# Patient Record
Sex: MALE | Race: WHITE | NOT HISPANIC OR LATINO | URBAN - METROPOLITAN AREA
[De-identification: names, ages, dates, MRNs, and addresses within clinical notes are randomized per-mention and may not be internally consistent; named-entity substitution may affect disease eponyms.]

---

## 2018-08-14 ENCOUNTER — INPATIENT (INPATIENT)
Facility: HOSPITAL | Age: 58
LOS: 2 days | Discharge: HOME | DRG: 305 | End: 2018-08-17
Attending: STUDENT IN AN ORGANIZED HEALTH CARE EDUCATION/TRAINING PROGRAM | Admitting: STUDENT IN AN ORGANIZED HEALTH CARE EDUCATION/TRAINING PROGRAM
Payer: MEDICAID

## 2018-08-14 VITALS — HEIGHT: 68 IN | WEIGHT: 240.08 LBS

## 2018-08-14 DIAGNOSIS — I16.1 HYPERTENSIVE EMERGENCY: ICD-10-CM

## 2018-08-14 DIAGNOSIS — Z98.890 OTHER SPECIFIED POSTPROCEDURAL STATES: Chronic | ICD-10-CM

## 2018-08-14 DIAGNOSIS — Z90.49 ACQUIRED ABSENCE OF OTHER SPECIFIED PARTS OF DIGESTIVE TRACT: Chronic | ICD-10-CM

## 2018-08-14 DIAGNOSIS — I10 ESSENTIAL (PRIMARY) HYPERTENSION: ICD-10-CM

## 2018-08-14 DIAGNOSIS — Z29.9 ENCOUNTER FOR PROPHYLACTIC MEASURES, UNSPECIFIED: ICD-10-CM

## 2018-08-14 LAB
ACETONE SERPL-MCNC: NEGATIVE — SIGNIFICANT CHANGE UP
ALBUMIN SERPL ELPH-MCNC: 4.2 G/DL — SIGNIFICANT CHANGE UP (ref 3.5–5)
ALP SERPL-CCNC: 58 U/L — SIGNIFICANT CHANGE UP (ref 40–120)
ALT FLD-CCNC: 50 U/L DA — SIGNIFICANT CHANGE UP (ref 10–60)
ANION GAP SERPL CALC-SCNC: 8 MMOL/L — SIGNIFICANT CHANGE UP (ref 5–17)
APTT BLD: 35.1 SEC — SIGNIFICANT CHANGE UP (ref 27.5–37.4)
AST SERPL-CCNC: 25 U/L — SIGNIFICANT CHANGE UP (ref 10–40)
BASOPHILS # BLD AUTO: 0.1 K/UL — SIGNIFICANT CHANGE UP (ref 0–0.2)
BASOPHILS NFR BLD AUTO: 1.2 % — SIGNIFICANT CHANGE UP (ref 0–2)
BILIRUB SERPL-MCNC: 0.4 MG/DL — SIGNIFICANT CHANGE UP (ref 0.2–1.2)
BUN SERPL-MCNC: 16 MG/DL — SIGNIFICANT CHANGE UP (ref 7–18)
CALCIUM SERPL-MCNC: 8.6 MG/DL — SIGNIFICANT CHANGE UP (ref 8.4–10.5)
CHLORIDE SERPL-SCNC: 102 MMOL/L — SIGNIFICANT CHANGE UP (ref 96–108)
CHOLEST SERPL-MCNC: 242 MG/DL — HIGH (ref 10–199)
CK MB BLD-MCNC: 0.9 % — SIGNIFICANT CHANGE UP (ref 0–3.5)
CK MB BLD-MCNC: 1 % — SIGNIFICANT CHANGE UP (ref 0–3.5)
CK MB CFR SERPL CALC: 1.7 NG/ML — SIGNIFICANT CHANGE UP (ref 0–3.6)
CK MB CFR SERPL CALC: 1.8 NG/ML — SIGNIFICANT CHANGE UP (ref 0–3.6)
CK SERPL-CCNC: 180 U/L — SIGNIFICANT CHANGE UP (ref 35–232)
CK SERPL-CCNC: 182 U/L — SIGNIFICANT CHANGE UP (ref 35–232)
CO2 SERPL-SCNC: 31 MMOL/L — SIGNIFICANT CHANGE UP (ref 22–31)
CREAT SERPL-MCNC: 1.29 MG/DL — SIGNIFICANT CHANGE UP (ref 0.5–1.3)
EOSINOPHIL # BLD AUTO: 0.2 K/UL — SIGNIFICANT CHANGE UP (ref 0–0.5)
EOSINOPHIL NFR BLD AUTO: 3.8 % — SIGNIFICANT CHANGE UP (ref 0–6)
GLUCOSE SERPL-MCNC: 81 MG/DL — SIGNIFICANT CHANGE UP (ref 70–99)
HCT VFR BLD CALC: 47.7 % — SIGNIFICANT CHANGE UP (ref 39–50)
HDLC SERPL-MCNC: 50 MG/DL — SIGNIFICANT CHANGE UP (ref 40–125)
HGB BLD-MCNC: 15.7 G/DL — SIGNIFICANT CHANGE UP (ref 13–17)
INR BLD: 0.92 RATIO — SIGNIFICANT CHANGE UP (ref 0.88–1.16)
LIDOCAIN IGE QN: 291 U/L — SIGNIFICANT CHANGE UP (ref 73–393)
LIPID PNL WITH DIRECT LDL SERPL: 163 MG/DL — SIGNIFICANT CHANGE UP
LYMPHOCYTES # BLD AUTO: 1.3 K/UL — SIGNIFICANT CHANGE UP (ref 1–3.3)
LYMPHOCYTES # BLD AUTO: 21.7 % — SIGNIFICANT CHANGE UP (ref 13–44)
MAGNESIUM SERPL-MCNC: 2.1 MG/DL — SIGNIFICANT CHANGE UP (ref 1.6–2.6)
MCHC RBC-ENTMCNC: 29.2 PG — SIGNIFICANT CHANGE UP (ref 27–34)
MCHC RBC-ENTMCNC: 33 GM/DL — SIGNIFICANT CHANGE UP (ref 32–36)
MCV RBC AUTO: 88.6 FL — SIGNIFICANT CHANGE UP (ref 80–100)
MONOCYTES # BLD AUTO: 0.7 K/UL — SIGNIFICANT CHANGE UP (ref 0–0.9)
MONOCYTES NFR BLD AUTO: 11.2 % — SIGNIFICANT CHANGE UP (ref 2–14)
NEUTROPHILS # BLD AUTO: 3.7 K/UL — SIGNIFICANT CHANGE UP (ref 1.8–7.4)
NEUTROPHILS NFR BLD AUTO: 62 % — SIGNIFICANT CHANGE UP (ref 43–77)
NT-PROBNP SERPL-SCNC: 36 PG/ML — SIGNIFICANT CHANGE UP (ref 0–125)
PLATELET # BLD AUTO: 220 K/UL — SIGNIFICANT CHANGE UP (ref 150–400)
POTASSIUM SERPL-MCNC: 3.4 MMOL/L — LOW (ref 3.5–5.3)
POTASSIUM SERPL-SCNC: 3.4 MMOL/L — LOW (ref 3.5–5.3)
PROT SERPL-MCNC: 8 G/DL — SIGNIFICANT CHANGE UP (ref 6–8.3)
PROTHROM AB SERPL-ACNC: 10 SEC — SIGNIFICANT CHANGE UP (ref 9.8–12.7)
RBC # BLD: 5.38 M/UL — SIGNIFICANT CHANGE UP (ref 4.2–5.8)
RBC # FLD: 12.2 % — SIGNIFICANT CHANGE UP (ref 10.3–14.5)
SODIUM SERPL-SCNC: 141 MMOL/L — SIGNIFICANT CHANGE UP (ref 135–145)
TOTAL CHOLESTEROL/HDL RATIO MEASUREMENT: 4.8 RATIO — SIGNIFICANT CHANGE UP (ref 3.4–9.6)
TRIGL SERPL-MCNC: 146 MG/DL — SIGNIFICANT CHANGE UP (ref 10–149)
TROPONIN I SERPL-MCNC: <0.015 NG/ML — SIGNIFICANT CHANGE UP (ref 0–0.04)
TROPONIN I SERPL-MCNC: <0.015 NG/ML — SIGNIFICANT CHANGE UP (ref 0–0.04)
TSH SERPL-MCNC: 3.1 UU/ML — SIGNIFICANT CHANGE UP (ref 0.34–4.82)
WBC # BLD: 6 K/UL — SIGNIFICANT CHANGE UP (ref 3.8–10.5)
WBC # FLD AUTO: 6 K/UL — SIGNIFICANT CHANGE UP (ref 3.8–10.5)

## 2018-08-14 PROCEDURE — 71045 X-RAY EXAM CHEST 1 VIEW: CPT | Mod: 26

## 2018-08-14 PROCEDURE — 99291 CRITICAL CARE FIRST HOUR: CPT

## 2018-08-14 RX ORDER — LABETALOL HCL 100 MG
10 TABLET ORAL ONCE
Qty: 0 | Refills: 0 | Status: DISCONTINUED | OUTPATIENT
Start: 2018-08-14 | End: 2018-08-14

## 2018-08-14 RX ORDER — ENOXAPARIN SODIUM 100 MG/ML
40 INJECTION SUBCUTANEOUS DAILY
Qty: 0 | Refills: 0 | Status: DISCONTINUED | OUTPATIENT
Start: 2018-08-14 | End: 2018-08-17

## 2018-08-14 RX ORDER — NITROGLYCERIN 6.5 MG
2 CAPSULE, EXTENDED RELEASE ORAL
Qty: 50 | Refills: 0 | Status: DISCONTINUED | OUTPATIENT
Start: 2018-08-14 | End: 2018-08-15

## 2018-08-14 RX ORDER — METOPROLOL TARTRATE 50 MG
100 TABLET ORAL
Qty: 0 | Refills: 0 | Status: DISCONTINUED | OUTPATIENT
Start: 2018-08-14 | End: 2018-08-14

## 2018-08-14 RX ORDER — LISINOPRIL 2.5 MG/1
5 TABLET ORAL DAILY
Qty: 0 | Refills: 0 | Status: DISCONTINUED | OUTPATIENT
Start: 2018-08-14 | End: 2018-08-14

## 2018-08-14 RX ORDER — LABETALOL HCL 100 MG
200 TABLET ORAL EVERY 8 HOURS
Qty: 0 | Refills: 0 | Status: DISCONTINUED | OUTPATIENT
Start: 2018-08-14 | End: 2018-08-14

## 2018-08-14 RX ORDER — POTASSIUM CHLORIDE 20 MEQ
40 PACKET (EA) ORAL ONCE
Qty: 0 | Refills: 0 | Status: COMPLETED | OUTPATIENT
Start: 2018-08-14 | End: 2018-08-14

## 2018-08-14 RX ORDER — LABETALOL HCL 100 MG
100 TABLET ORAL EVERY 8 HOURS
Qty: 0 | Refills: 0 | Status: DISCONTINUED | OUTPATIENT
Start: 2018-08-14 | End: 2018-08-14

## 2018-08-14 RX ORDER — METOPROLOL TARTRATE 50 MG
100 TABLET ORAL EVERY 8 HOURS
Qty: 0 | Refills: 0 | Status: DISCONTINUED | OUTPATIENT
Start: 2018-08-15 | End: 2018-08-15

## 2018-08-14 RX ORDER — ASPIRIN/CALCIUM CARB/MAGNESIUM 324 MG
162 TABLET ORAL ONCE
Qty: 0 | Refills: 0 | Status: COMPLETED | OUTPATIENT
Start: 2018-08-14 | End: 2018-08-14

## 2018-08-14 RX ORDER — LABETALOL HCL 100 MG
20 TABLET ORAL ONCE
Qty: 0 | Refills: 0 | Status: COMPLETED | OUTPATIENT
Start: 2018-08-14 | End: 2018-08-14

## 2018-08-14 RX ORDER — ISOSORBIDE MONONITRATE 60 MG/1
60 TABLET, EXTENDED RELEASE ORAL DAILY
Qty: 0 | Refills: 0 | Status: DISCONTINUED | OUTPATIENT
Start: 2018-08-14 | End: 2018-08-15

## 2018-08-14 RX ORDER — NITROGLYCERIN 6.5 MG
0.4 CAPSULE, EXTENDED RELEASE ORAL
Qty: 0 | Refills: 0 | Status: DISCONTINUED | OUTPATIENT
Start: 2018-08-14 | End: 2018-08-17

## 2018-08-14 RX ORDER — LISINOPRIL 2.5 MG/1
10 TABLET ORAL
Qty: 0 | Refills: 0 | Status: DISCONTINUED | OUTPATIENT
Start: 2018-08-15 | End: 2018-08-15

## 2018-08-14 RX ORDER — ACETAMINOPHEN 500 MG
650 TABLET ORAL ONCE
Qty: 0 | Refills: 0 | Status: COMPLETED | OUTPATIENT
Start: 2018-08-14 | End: 2018-08-14

## 2018-08-14 RX ORDER — ASPIRIN/CALCIUM CARB/MAGNESIUM 324 MG
81 TABLET ORAL DAILY
Qty: 0 | Refills: 0 | Status: DISCONTINUED | OUTPATIENT
Start: 2018-08-14 | End: 2018-08-17

## 2018-08-14 RX ORDER — ATORVASTATIN CALCIUM 80 MG/1
40 TABLET, FILM COATED ORAL AT BEDTIME
Qty: 0 | Refills: 0 | Status: DISCONTINUED | OUTPATIENT
Start: 2018-08-14 | End: 2018-08-17

## 2018-08-14 RX ORDER — NITROGLYCERIN 6.5 MG
0.4 CAPSULE, EXTENDED RELEASE ORAL
Qty: 0 | Refills: 0 | Status: DISCONTINUED | OUTPATIENT
Start: 2018-08-14 | End: 2018-08-14

## 2018-08-14 RX ORDER — SODIUM CHLORIDE 9 MG/ML
3 INJECTION INTRAMUSCULAR; INTRAVENOUS; SUBCUTANEOUS ONCE
Qty: 0 | Refills: 0 | Status: COMPLETED | OUTPATIENT
Start: 2018-08-14 | End: 2018-08-14

## 2018-08-14 RX ADMIN — Medication 0.4 MILLIGRAM(S): at 11:02

## 2018-08-14 RX ADMIN — LISINOPRIL 5 MILLIGRAM(S): 2.5 TABLET ORAL at 16:49

## 2018-08-14 RX ADMIN — Medication 162 MILLIGRAM(S): at 13:02

## 2018-08-14 RX ADMIN — Medication 40 MILLIEQUIVALENT(S): at 13:57

## 2018-08-14 RX ADMIN — Medication 0.6 MICROGRAM(S)/MIN: at 15:14

## 2018-08-14 RX ADMIN — Medication 650 MILLIGRAM(S): at 13:57

## 2018-08-14 RX ADMIN — SODIUM CHLORIDE 3 MILLILITER(S): 9 INJECTION INTRAMUSCULAR; INTRAVENOUS; SUBCUTANEOUS at 12:58

## 2018-08-14 RX ADMIN — Medication 20 MILLIGRAM(S): at 16:41

## 2018-08-14 RX ADMIN — ENOXAPARIN SODIUM 40 MILLIGRAM(S): 100 INJECTION SUBCUTANEOUS at 16:49

## 2018-08-14 RX ADMIN — Medication 100 MILLIGRAM(S): at 18:27

## 2018-08-14 RX ADMIN — Medication 2.5 MILLIGRAM(S): at 22:39

## 2018-08-14 RX ADMIN — ATORVASTATIN CALCIUM 40 MILLIGRAM(S): 80 TABLET, FILM COATED ORAL at 21:09

## 2018-08-14 RX ADMIN — ISOSORBIDE MONONITRATE 60 MILLIGRAM(S): 60 TABLET, EXTENDED RELEASE ORAL at 22:39

## 2018-08-14 NOTE — H&P ADULT - ASSESSMENT
58 male with PMH of HTN ( labile BP , tried on multiple medications , finally stable on Metoprolol 100 bid ) , comes in with complaint of chest pressure , diaphoresis and sob . Patient was coming to see his relative , who is currently admitted   in [facility] , was driving and suddenly felt all these symptoms. Patient describes it as a pressure on Left side of chest , radiating to left arm and numbness and cold sensation on left arm . BP was measured before coming in , was 240/129 in Left arm and 234/126 in right and he took his home medication metoprolol , but had no relief of symptoms , after which he decided to come to Er. Denies any nausea , vomiting , abdominal pain .   Sob was described as that occurring with 2 flight of stairs.   Cardiac HPI : Patient had a Stress test >1 year ago , negative. Was noted to have some arrythmia and was told by cardiologist to get Holter monitor , has not followed up yet.   FH : HTN in Mother   Social history : smoker , quit 18 years ago , occasional alcohol , no drugs     In Ed , BP : 203/114 mm hg , Hr : 73 , Temp : 98 F   Patient was given aspirin 160 mg and Niro 0.4 mg *1 , and patient's symptoms improved. BP came down to 170/90 mm hg. However , while seeing patient , patient again went diaphoretic , started having chest pain and BP went up to 238/110 mm hg. Nitroglycerin drip was started and ICU was consulted for hypertensive emergency.     Will admit to ICU for hypertensive emergency.

## 2018-08-14 NOTE — ED PROVIDER NOTE - PROGRESS NOTE DETAILS
Given NTG x2, pt feeling much better.  Pt with syncope, CP, will admit, d/w Dr. Fisher Given NTG x2, pt feeling much better.  Pt with syncope, CP, will admit, d/w Dr. Fisher, B/P 156/85

## 2018-08-14 NOTE — ED PROVIDER NOTE - MUSCULOSKELETAL, MLM
Spine appears normal, range of motion is not limited, no muscle or joint tenderness. No calf tenderness, no CVA tenderness.

## 2018-08-14 NOTE — ED ADULT TRIAGE NOTE - CHIEF COMPLAINT QUOTE
Pt c/o Chest pain while visiting a relative on floor x few minutes ago. Says pain radiates to left arm and has nausea.

## 2018-08-14 NOTE — CHART NOTE - NSCHARTNOTEFT_GEN_A_CORE
Was called by ED attending to admit patient to my service.   Pt is 57 yo male with PMH of HTN was visiting his father in law here at [facility] and developed dyspnea and diaphoresis with SBP of 230.  In ED was given nitro which helped to bring the BP down.   While I was taking to him and examining he became diaphoretic and  c/o SOB. BP elevated to 238 systolic again.   Started him on nitro drip and ordered to get EKG. Called ICU.   Patient got excepted to ICU for hypertensive emergency.

## 2018-08-14 NOTE — H&P ADULT - PROBLEM SELECTOR PLAN 2
Chest pain and diaphoresis with left arm numbness   Concern for ACS   Michi score is 1   T1 negative , will trend T2 , T3   Monitor on tele   Aspirin , statin , b-blocker   Echocardiogram   Will need Stress test likely based on symptoms after stabilization of BP

## 2018-08-14 NOTE — H&P ADULT - ATTENDING COMMENTS
Patient seen and examined with resident, Addendum to above.    59 y/o male with history of hypertension presented to the ED with chest pain, diaphoresis and lightheadedness. Found to be hypertensive in the ED. Was given Labetalol IV push with out resolution of symptoms so was stared on Nitroglycerine drip. No EKG changes to suggest NSTEMI. Cardiac enzymes are negative. No focal neurologic deficits noted. States he already took his home dose of metoprolol this morning. Will recommend to continue home metoprolol. Start Oral lisinopril. And transition off nitroglycerine drip. Check Echocardiogram. Trend cardiac enzymes. F/u cardiology recs as consulted prior to my evaluation. Aspirin/Statin. Check cholesterol panel. Oral diet. Patient seen and examined with resident, Addendum to above.    57 y/o male with history of hypertension presented to the ED with chest pain, diaphoresis and lightheadedness. Found to be hypertensive in the ED. Was given Labetalol IV push with out resolution of symptoms so was stared on Nitroglycerine drip. No EKG changes to suggest NSTEMI. Cardiac enzymes are negative. No focal neurologic deficits noted. States he already took his home dose of metoprolol this morning. Will admit to ICU for Hypertensive URGENCY. Will recommend to continue home metoprolol. Start Oral lisinopril. And transition off nitroglycerine drip. Check Echocardiogram. Trend cardiac enzymes. F/u cardiology recs as consulted prior to my evaluation. Aspirin/Statin. Check cholesterol panel. Oral diet.

## 2018-08-14 NOTE — H&P ADULT - PROBLEM SELECTOR PLAN 1
Patient coming in elevated BP , chest pressure , diaphoresis   Will start on Nitroglycerin drip , will titrate for BP of 160/90 mm hg for first 24 hours   T1 was negative , Will trend troponin  EKG shows LVH , no St- T wave changes , normal intervals *2   Start on aspirin , statin   Continue with home dose Metoprolol 100 bid   Start on ACE , Lisinopril 5 mg Od   Dash diet   Get echocardiogram   Consult cardio , Dr. Glass   Off note , patient has labile BP since many years and initial labs reveal hypokalemia   Can suspect Adrenal cause   Will send renin , aldosterone , am cortisol Patient coming in elevated BP , chest pressure , diaphoresis   Will start on Nitroglycerin drip , will titrate for BP of 180/90 mm hg for first 24 hours   T1 was negative , Will trend troponin  EKG shows LVH , no St- T wave changes , normal intervals *2   Start on aspirin , statin   Continue with home dose Metoprolol 100 bid   Start on ACE , Lisinopril 5 mg Od   Dash diet   Get echocardiogram   Consult cardio , Dr. Glass   Off note , patient has labile BP since many years and initial labs reveal hypokalemia   Can suspect Adrenal cause   Will send renin , aldosterone , am cortisol

## 2018-08-14 NOTE — ED PROVIDER NOTE - OBJECTIVE STATEMENT
[place] citizen translation done by Friday DAMARIS RN    57 y/o M w/ PMHx of HTN, and PSHx of appendectomy, meniscus repair of R knee, non smoker, presents to ED c/o sudden onset sharp, L sided, chest pain, w/ radiation to L arm. Pt has had similar episodes recently, but not as severe as today's, lasting for minutes at a time. Today's episode lasted 5 minutes, pt had to pull over and stop while driving, describes losing consciousness for several seconds, and then finding himself diaphoretic and short of breath. Pt also c/o nausea and neck pain. Denies any dizziness, or any other complaints. Pt has seen a cardiologist recently, Dr. Nicolás Jama, and was advised to return for a Holter monitor, but pt never followed up. Last stress test was two years ago, was normal. Pt was upstairs visiting his father in law in ICU, when symptoms returned today, and was noted to have BP of 250/135, after which he was given Lopressor. Pt confirms taking his Metoprolol this morning.

## 2018-08-14 NOTE — H&P ADULT - HISTORY OF PRESENT ILLNESS
58 male with PMH of HTN ( labile BP , tried on multiple medications , finally stable on Metoprolol 100 bid ) , comes in with complaint of chest pressure , diaphoresis and sob . Patient was coming to see his relative , who is currently admitted   in [facility] , was driving and suddenly felt all these symptoms. Patient describes it as a pressure on Left side of chest , radiating to left arm and numbness and cold sensation on left arm . BP was measured before coming in , was 240/129 in Left arm and 234/126 in right and he took his home medication metoprolol , but had no relief of symptoms , after which he decided to come to Er. Denies any nausea , vomiting , abdominal pain .   Sob was described as that occurring with 2 flight of stairs.   Cardiac HPI : Patient had a Stress test >1 year ago , negative. Was noted to have some arrythmia and was told by cardiologist to get Holter monitor , has not followed up yet.   FH : HTN in Mother   Social history : Former smoker , quit 18 years ago , occasional alcohol , no drugs     In Ed , BP : 203/114 mm hg , Hr : 73 , Temp : 98 F   Patient was given aspirin 160 mg and Niro 0.4 mg *1 , and patient's symptoms improved. BP came down to 170/90 mm hg. However , while seeing patient , patient again went diaphoretic , started having chest pain and BP went up to 238/110 mm hg. ICU was consulted for hypertensive emergency.

## 2018-08-14 NOTE — ED PROVIDER NOTE - MEDICAL DECISION MAKING DETAILS
59 y/o M here w/ episode of LOC, diaphoresis and chest pain; concern for ACS. Will check labs, EKG and prepare for admission.

## 2018-08-14 NOTE — ED PROVIDER NOTE - CRITICAL CARE PROVIDED
additional history taking/documentation/consultation with other physicians/direct patient care (not related to procedure)/interpretation of diagnostic studies

## 2018-08-14 NOTE — H&P ADULT - PROBLEM SELECTOR PLAN 3
IMPROVE VTE Individual Risk Assessment          RISK                                                          Points  [  ] Previous VTE                                                3  [  ] Thrombophilia                                             2  [  ] Lower limb paralysis                                   2        (unable to hold up >15 seconds)    [  ] Current Cancer                                             2         (within 6 months)  [ x ] Immobilization > 24 hrs                              1  [ x ] ICU/CCU stay > 24 hours                             1  [  ] Age > 60                                                         1    IMPROVE VTE Score: 2   Will start on Levonox for dvt ppx

## 2018-08-14 NOTE — ED ADULT NURSE NOTE - NSIMPLEMENTINTERV_GEN_ALL_ED
Implemented All Universal Safety Interventions:  [place] to call system. Call bell, personal items and telephone within reach. Instruct patient to call for assistance. Room bathroom lighting operational. Non-slip footwear when patient is off stretcher. Physically safe environment: no spills, clutter or unnecessary equipment. Stretcher in lowest position, wheels locked, appropriate side rails in place.

## 2018-08-14 NOTE — ED PROVIDER NOTE - CARE PLAN
Principal Discharge DX:	Malignant hypertension  Secondary Diagnosis:	ACS (acute coronary syndrome)  Secondary Diagnosis:	Syncope

## 2018-08-14 NOTE — H&P ADULT - NSHPLABSRESULTS_GEN_ALL_CORE
15.7   6.0   )-----------( 220      ( 14 Aug 2018 12:42 )             47.7     08-14    141  |  102  |  16  ----------------------------<  81  3.4<L>   |  31  |  1.29    Ca    8.6      14 Aug 2018 12:42  Mg     2.1     08-14    TPro  8.0  /  Alb  4.2  /  TBili  0.4  /  DBili  x   /  AST  25  /  ALT  50  /  AlkPhos  58  08-14      < from: Xray Chest 1 View AP/PA (08.14.18 @ 13:01) >    There is no prior radiograph available for comparison at the time of this   report.    FINDINGS:        There are no lung consolidations. The cardiac and mediastinal contours   are prominent, which may be due to magnification from AP technique and   shallow inspiration. The osseous structures are intact.    IMPRESSION:    No lung consolidations.    < end of copied text >

## 2018-08-14 NOTE — H&P ADULT - NEUROLOGICAL DETAILS
responds to pain/alert and oriented x 3/responds to verbal commands/cranial nerves intact/sensation intact/deep reflexes intact

## 2018-08-15 LAB
ACTH SER-ACNC: 26 PG/ML — SIGNIFICANT CHANGE UP (ref 5–46)
ALDOST SERPL-MCNC: 10.5 NG/DL — SIGNIFICANT CHANGE UP
ANION GAP SERPL CALC-SCNC: 10 MMOL/L — SIGNIFICANT CHANGE UP (ref 5–17)
BUN SERPL-MCNC: 16 MG/DL — SIGNIFICANT CHANGE UP (ref 7–18)
CALCIUM SERPL-MCNC: 8.4 MG/DL — SIGNIFICANT CHANGE UP (ref 8.4–10.5)
CHLORIDE SERPL-SCNC: 104 MMOL/L — SIGNIFICANT CHANGE UP (ref 96–108)
CK MB BLD-MCNC: <0.8 % — SIGNIFICANT CHANGE UP (ref 0–3.5)
CK MB CFR SERPL CALC: <1 NG/ML — SIGNIFICANT CHANGE UP (ref 0–3.6)
CK SERPL-CCNC: 119 U/L — SIGNIFICANT CHANGE UP (ref 35–232)
CO2 SERPL-SCNC: 24 MMOL/L — SIGNIFICANT CHANGE UP (ref 22–31)
CORTIS AM PEAK SERPL-MCNC: 11.5 UG/DL — SIGNIFICANT CHANGE UP (ref 6–18.4)
CREAT SERPL-MCNC: 1.23 MG/DL — SIGNIFICANT CHANGE UP (ref 0.5–1.3)
GLUCOSE SERPL-MCNC: 89 MG/DL — SIGNIFICANT CHANGE UP (ref 70–99)
HBA1C BLD-MCNC: 6 % — HIGH (ref 4–5.6)
HCT VFR BLD CALC: 42.1 % — SIGNIFICANT CHANGE UP (ref 39–50)
HGB BLD-MCNC: 13.9 G/DL — SIGNIFICANT CHANGE UP (ref 13–17)
MAGNESIUM SERPL-MCNC: 2.2 MG/DL — SIGNIFICANT CHANGE UP (ref 1.6–2.6)
MCHC RBC-ENTMCNC: 29.6 PG — SIGNIFICANT CHANGE UP (ref 27–34)
MCHC RBC-ENTMCNC: 33.1 GM/DL — SIGNIFICANT CHANGE UP (ref 32–36)
MCV RBC AUTO: 89.5 FL — SIGNIFICANT CHANGE UP (ref 80–100)
PCP SPEC-MCNC: SIGNIFICANT CHANGE UP
PHOSPHATE SERPL-MCNC: 3.2 MG/DL — SIGNIFICANT CHANGE UP (ref 2.5–4.5)
PLATELET # BLD AUTO: 213 K/UL — SIGNIFICANT CHANGE UP (ref 150–400)
POTASSIUM SERPL-MCNC: 3.5 MMOL/L — SIGNIFICANT CHANGE UP (ref 3.5–5.3)
POTASSIUM SERPL-SCNC: 3.5 MMOL/L — SIGNIFICANT CHANGE UP (ref 3.5–5.3)
RBC # BLD: 4.71 M/UL — SIGNIFICANT CHANGE UP (ref 4.2–5.8)
RBC # FLD: 12.6 % — SIGNIFICANT CHANGE UP (ref 10.3–14.5)
RENIN DIRECT, PLASMA: 4 PG/ML — SIGNIFICANT CHANGE UP
SODIUM SERPL-SCNC: 138 MMOL/L — SIGNIFICANT CHANGE UP (ref 135–145)
TROPONIN I SERPL-MCNC: <0.015 NG/ML — SIGNIFICANT CHANGE UP (ref 0–0.04)
WBC # BLD: 6.9 K/UL — SIGNIFICANT CHANGE UP (ref 3.8–10.5)
WBC # FLD AUTO: 6.9 K/UL — SIGNIFICANT CHANGE UP (ref 3.8–10.5)

## 2018-08-15 PROCEDURE — 99223 1ST HOSP IP/OBS HIGH 75: CPT

## 2018-08-15 RX ORDER — LISINOPRIL 2.5 MG/1
5 TABLET ORAL ONCE
Qty: 0 | Refills: 0 | Status: COMPLETED | OUTPATIENT
Start: 2018-08-15 | End: 2018-08-15

## 2018-08-15 RX ORDER — POLYETHYLENE GLYCOL 3350 17 G/17G
17 POWDER, FOR SOLUTION ORAL DAILY
Qty: 0 | Refills: 0 | Status: DISCONTINUED | OUTPATIENT
Start: 2018-08-15 | End: 2018-08-17

## 2018-08-15 RX ORDER — LISINOPRIL 2.5 MG/1
5 TABLET ORAL DAILY
Qty: 0 | Refills: 0 | Status: DISCONTINUED | OUTPATIENT
Start: 2018-08-15 | End: 2018-08-16

## 2018-08-15 RX ORDER — ACETAMINOPHEN 500 MG
650 TABLET ORAL ONCE
Qty: 0 | Refills: 0 | Status: COMPLETED | OUTPATIENT
Start: 2018-08-15 | End: 2018-08-15

## 2018-08-15 RX ORDER — ISOSORBIDE MONONITRATE 60 MG/1
30 TABLET, EXTENDED RELEASE ORAL DAILY
Qty: 0 | Refills: 0 | Status: DISCONTINUED | OUTPATIENT
Start: 2018-08-15 | End: 2018-08-15

## 2018-08-15 RX ORDER — SENNA PLUS 8.6 MG/1
2 TABLET ORAL AT BEDTIME
Qty: 0 | Refills: 0 | Status: DISCONTINUED | OUTPATIENT
Start: 2018-08-15 | End: 2018-08-17

## 2018-08-15 RX ORDER — POLYETHYLENE GLYCOL 3350 17 G/17G
17 POWDER, FOR SOLUTION ORAL ONCE
Qty: 0 | Refills: 0 | Status: COMPLETED | OUTPATIENT
Start: 2018-08-15 | End: 2018-08-15

## 2018-08-15 RX ORDER — METOPROLOL TARTRATE 50 MG
100 TABLET ORAL EVERY 12 HOURS
Qty: 0 | Refills: 0 | Status: DISCONTINUED | OUTPATIENT
Start: 2018-08-16 | End: 2018-08-16

## 2018-08-15 RX ORDER — ACETAMINOPHEN 500 MG
650 TABLET ORAL EVERY 6 HOURS
Qty: 0 | Refills: 0 | Status: DISCONTINUED | OUTPATIENT
Start: 2018-08-15 | End: 2018-08-17

## 2018-08-15 RX ADMIN — LISINOPRIL 5 MILLIGRAM(S): 2.5 TABLET ORAL at 06:43

## 2018-08-15 RX ADMIN — ATORVASTATIN CALCIUM 40 MILLIGRAM(S): 80 TABLET, FILM COATED ORAL at 22:13

## 2018-08-15 RX ADMIN — ENOXAPARIN SODIUM 40 MILLIGRAM(S): 100 INJECTION SUBCUTANEOUS at 12:18

## 2018-08-15 RX ADMIN — POLYETHYLENE GLYCOL 3350 17 GRAM(S): 17 POWDER, FOR SOLUTION ORAL at 22:13

## 2018-08-15 RX ADMIN — Medication 650 MILLIGRAM(S): at 22:39

## 2018-08-15 RX ADMIN — POLYETHYLENE GLYCOL 3350 17 GRAM(S): 17 POWDER, FOR SOLUTION ORAL at 04:35

## 2018-08-15 RX ADMIN — Medication 81 MILLIGRAM(S): at 12:19

## 2018-08-15 RX ADMIN — Medication 650 MILLIGRAM(S): at 03:09

## 2018-08-15 RX ADMIN — SENNA PLUS 2 TABLET(S): 8.6 TABLET ORAL at 22:13

## 2018-08-15 RX ADMIN — Medication 650 MILLIGRAM(S): at 23:26

## 2018-08-15 RX ADMIN — Medication 650 MILLIGRAM(S): at 02:09

## 2018-08-15 RX ADMIN — Medication 1.25 MILLIGRAM(S): at 22:31

## 2018-08-15 NOTE — CONSULT NOTE ADULT - ASSESSMENT
59 yo M smoker with HTN who presented with chest pain in setting of hypertensive urgency    1. HTN: Currently on lisinopril, BP is at target in 130s-140s systolic; will gradually add on medication as BP starts to increase (can uptitrate lisinopril, since patient's A1c is 6)  -Check echocardiogram for LVH  -Secondary HTN work-up including renin, aldosterone, cortisol levels  -Given very high BP, would perform non-contrast CT head to rule out ICH    2. Chest pain: Likely due to underlying HTN, particularly in setting of negative cardiac enzymes x3; however patient will likely need stress test given his cardiac risk factors  -Likely stress test tomorrow when patient is downgraded from ICU; would perform CT head prior as per above    3. HLD: Patient's 10-year risk of atherosclerotic cardiovascular disease (ASCVD) is 12.8% based on the pooled cohort equations.   -Recommend initiation of moderate intensity statin, atorvastatin 20mg PO QHS    ***Note that this is a preliminary note and any recommendations should NOT be carried out until this note is finalized. *** 57 yo M smoker with HTN who presented with chest pain in setting of hypertensive urgency    1. HTN: Currently on lisinopril, BP is at target in 130s-140s systolic; will gradually add on medication as BP starts to increase (can uptitrate lisinopril, since patient's A1c is 6)  -Check echocardiogram for LVH  -Secondary HTN work-up including renin, aldosterone, cortisol levels  -Given very high BP, would perform non-contrast CT head to rule out ICH    2. Chest pain: Likely due to underlying HTN, particularly in setting of negative cardiac enzymes x3; however patient will likely need stress test given his cardiac risk factors  -Likely stress test tomorrow when patient is downgraded from ICU; would perform CT head prior as per above    3. HLD: Patient's 10-year risk of atherosclerotic cardiovascular disease (ASCVD) is 12.8% based on the pooled cohort equations.   -Recommend initiation of moderate intensity statin, atorvastatin 20mg PO QHS

## 2018-08-15 NOTE — CHART NOTE - NSCHARTNOTEFT_GEN_A_CORE
ICU DOWNGRADE NOTE:    58 male with PMH of HTN ( labile BP , tried on multiple medications , finally stable on Metoprolol 100 bid ), ex-smoker , comes in with complaint of chest pressure , diaphoresis and sob . Patient was coming to see his relative , who is currently admitted in [facility] , was driving and suddenly felt all these symptoms. Patient describes it as a pressure on Left side of chest , radiating to left arm and numbness and cold sensation on left arm . BP was measured before coming in , was 240/129 in Left arm and 234/126 in right and he took his home medication metoprolol , but had no relief of symptoms , after which he decided to come to Er. Sob was described as that occurring with 2 flight of stairs.  Patient had a Stress test >1 year ago , negative. Was noted to have some arrythmia and was told by cardiologist to get Holter monitor , has not followed up yet. Pt originally admitted to ICU for hypertensive emergency due to concurrent headache and chest pain. s/p nitroglycerin drip     Problem/Plan - 1:  ·  Problem: Hypertensive urgency.  Plan: RESOLVED  current BP is approx 140/90  EKG shows LVH , no St- T wave changes , normal intervals   troponins x3 negative  c/w aspirin , statin   Continue with home dose Metoprolol 100 bid with parameters starting 8/16  c/w Lisinopril 5 mg OD  Dash diet   echocardiogram - EF 55% with grade 2 diastolic dysfunction  f/u cardiology consult - Dr. Glass   f/u renin , aldosterone , am cortisol for secondary causes of hypertension.      Problem/Plan - 2:  ·  Problem: R/O ACS (acute coronary syndrome).  Plan: RULED OUT: trop x 3 negative  Monitor on tele   Aspirin , statin , b-blocker   echocardiogram - EF 55% with grade 2 diastolic dysfunction  f/u cardiology consult - Dr. Glass      Patient is stable for downgrade to regular medical floor as discussed in ICU rounds. Attending Dr. Fisher informed. ICU DOWNGRADE NOTE:    58 male with PMH of HTN ( labile BP , tried on multiple medications , finally stable on Metoprolol 100 bid ), ex-smoker , comes in with complaint of chest pressure , diaphoresis and sob . Patient was coming to see his relative , who is currently admitted in [facility] , was driving and suddenly felt all these symptoms. Patient describes it as a pressure on Left side of chest , radiating to left arm and numbness and cold sensation on left arm . BP was measured before coming in , was 240/129 in Left arm and 234/126 in right and he took his home medication metoprolol , but had no relief of symptoms , after which he decided to come to Er. Sob was described as that occurring with 2 flight of stairs.  Patient had a Stress test >1 year ago , negative. Was noted to have some arrythmia and was told by cardiologist to get Holter monitor , has not followed up yet. Pt originally admitted to ICU for hypertensive emergency due to concurrent headache and chest pain. s/p nitroglycerin drip     Problem/Plan - 1:  ·  Problem: Hypertensive urgency.  Plan: RESOLVED  current BP is approx 140/90  EKG shows LVH , no St- T wave changes , normal intervals   troponins x3 negative  c/w aspirin , statin   Continue with home dose Metoprolol 100 bid with parameters starting 8/16  c/w Lisinopril 5 mg OD  Dash diet   echocardiogram - EF 55% with grade 2 diastolic dysfunction  f/u cardiology consult - Dr. Glass   f/u renin , aldosterone , am cortisol for secondary causes of hypertension.      Problem/Plan - 2:  ·  Problem: R/O ACS (acute coronary syndrome).  Plan: RULED OUT: trop x 3 negative  Monitor on tele   Aspirin , statin , b-blocker   echocardiogram - EF 55% with grade 2 diastolic dysfunction  f/u cardiology consult - Dr. Glass      Patient is stable for downgrade to regular medical floor as discussed in ICU rounds. Attending Dr. Raffy fernandes. ICU DOWNGRADE NOTE:    58 male with PMH of HTN ( labile BP , tried on multiple medications , finally stable on Metoprolol 100 bid ), ex-smoker , comes in with complaint of chest pressure , diaphoresis and sob . Patient was coming to see his relative , who is currently admitted in [facility] , was driving and suddenly felt all these symptoms. Patient describes it as a pressure on Left side of chest , radiating to left arm and numbness and cold sensation on left arm . BP was measured before coming in , was 240/129 in Left arm and 234/126 in right and he took his home medication metoprolol , but had no relief of symptoms , after which he decided to come to Er. Sob was described as that occurring with 2 flight of stairs.  Patient had a Stress test >1 year ago , negative. Was noted to have some arrythmia and was told by cardiologist to get Holter monitor , has not followed up yet. Pt originally admitted to ICU for hypertensive emergency due to concurrent headache and chest pain, however CT head and troponins negative. Initially started on nitroglycerin drip and titrated off, started on lisinopril PO.      Problem/Plan - 1:  ·  Problem: Hypertensive urgency.  Plan: RESOLVED  current BP is approx 140/90  EKG shows LVH , no St- T wave changes , normal intervals   troponins x3 negative  c/w aspirin , statin   Continue with home dose Metoprolol 100 bid with parameters starting 8/16  c/w Lisinopril 5 mg OD  Dash diet   echocardiogram - EF 55% with grade 2 diastolic dysfunction  f/u cardiology consult - Dr. Glass   f/u renin , aldosterone , am cortisol for secondary causes of hypertension       Problem/Plan - 2:  ·  Problem: Prophylactic measure.  Plan:     Patient is stable for downgrade to regular medical floor as discussed in ICU rounds. Attending Dr. Akbar informed.  Patient going to 5S ICU TRANSFER NOTE:    58 male with PMH of HTN ( labile BP , tried on multiple medications , finally stable on Metoprolol 100 bid ), ex-smoker , comes in with complaint of chest pressure , diaphoresis and sob . Patient was coming to see his relative , who is currently admitted in [facility] , was driving and suddenly felt all these symptoms. Patient describes it as a pressure on Left side of chest , radiating to left arm and numbness and cold sensation on left arm . BP was measured before coming in , was 240/129 in Left arm and 234/126 in right and he took his home medication metoprolol , but had no relief of symptoms , after which he decided to come to Er. Sob was described as that occurring with 2 flight of stairs.  Patient had a Stress test >1 year ago , negative. Was noted to have some arrythmia and was told by cardiologist to get Holter monitor , has not followed up yet. Pt originally admitted to ICU for hypertensive emergency due to concurrent headache and chest pain, however CT head and troponins negative. Initially started on nitroglycerin drip and titrated off, started on lisinopril PO.      Problem/Plan - 1:  ·  Problem: Hypertensive urgency.  Plan: RESOLVED  current BP is approx 140/90  EKG shows LVH , no St- T wave changes , normal intervals   troponins x3 negative  c/w aspirin , statin   Continue with home dose Metoprolol 100 bid with parameters starting 8/16  c/w Lisinopril 5 mg OD  Dash diet   echocardiogram - EF 55% with grade 2 diastolic dysfunction  f/u cardiology consult - Dr. Glass   f/u renin , aldosterone , am cortisol for secondary causes of hypertension       Problem/Plan - 2:  ·  Problem: Prophylactic measure.  Plan:     Patient is stable for downgrade to regular medical floor as discussed in ICU rounds. Attending Dr. Akbar informed.  Patient going to 5S

## 2018-08-15 NOTE — PROGRESS NOTE ADULT - PROBLEM SELECTOR PLAN 1
RESOLVED  current BP is approx 140/90  EKG shows LVH , no St- T wave changes , normal intervals *2   c/w aspirin , statin   Continue with home dose Metoprolol 100 bid   c/w Lisinopril 5 mg OD, lopressor 100mg BID PO with parameters  Dash diet   f/u echocardiogram  f/u cardiology consult - Dr. Glass   f/u renin , aldosterone , am cortisol RESOLVED  current BP is approx 140/90  EKG shows LVH , no St- T wave changes , normal intervals *2   c/w aspirin , statin   Continue with home dose Metoprolol 100 bid   c/w Lisinopril 5 mg OD  discontinued imdur 30mg ER daily  c/w lopressor 100mg BID PO with parameters starting tomorrow  Dash diet   f/u echocardiogram  f/u cardiology consult - Dr. Glass   f/u renin , aldosterone , am cortisol for secondary causes of hypertension

## 2018-08-15 NOTE — PROGRESS NOTE ADULT - SUBJECTIVE AND OBJECTIVE BOX
INTERVAL /OVERNIGHT EVENTS: no events overnight, nitro-drip discontinued at approx 7pm, restarted for short duration throughout the night, then discontinued    PRESSORS: [ ] YES [x ] NO    Cardiovascular:  isosorbide   mononitrate ER Tablet (IMDUR) 30 milliGRAM(s) Oral daily  lisinopril 5 milliGRAM(s) Oral daily  nitroglycerin     SubLingual 0.4 milliGRAM(s) SubLingual every 5 minutes PRN    Hematalogic:  aspirin  chewable 81 milliGRAM(s) Oral daily  enoxaparin Injectable 40 milliGRAM(s) SubCutaneous daily    Other:  atorvastatin 40 milliGRAM(s) Oral at bedtime  senna 2 Tablet(s) Oral at bedtime    aspirin  chewable 81 milliGRAM(s) Oral daily  atorvastatin 40 milliGRAM(s) Oral at bedtime  enoxaparin Injectable 40 milliGRAM(s) SubCutaneous daily  isosorbide   mononitrate ER Tablet (IMDUR) 30 milliGRAM(s) Oral daily  lisinopril 5 milliGRAM(s) Oral daily  nitroglycerin     SubLingual 0.4 milliGRAM(s) SubLingual every 5 minutes PRN  senna 2 Tablet(s) Oral at bedtime    Drug Dosing Weight  Height (cm): 172.72 (14 Aug 2018 11:55)  Weight (kg): 112.8 (14 Aug 2018 16:39)  BMI (kg/m2): 37.8 (14 Aug 2018 16:39)  BSA (m2): 2.24 (14 Aug 2018 16:39)    CENTRAL LINE: [ ] YES [x ] NO      DAY: [ ] YES [x ] NO        A-LINE:  [ ] YES [x ] NO      PMH -reviewed admission note, no change since admission    ICU Vital Signs Last 24 Hrs  T(C): 35.7 (15 Aug 2018 03:59), Max: 36.7 (14 Aug 2018 12:02)  T(F): 96.2 (15 Aug 2018 03:59), Max: 98.1 (14 Aug 2018 16:30)  HR: 57 (15 Aug 2018 06:00) (54 - 73)  BP: 135/76 (15 Aug 2018 06:00) (107/67 - 237/121)  BP(mean): 89 (15 Aug 2018 06:00) (77 - 135)  RR: 17 (15 Aug 2018 06:00) (9 - 24)  SpO2: 97% (15 Aug 2018 06:00) (91% - 100%)            08-14 @ 07:01  -  08-15 @ 07:00  --------------------------------------------------------  IN: 45.9 mL / OUT: 250 mL / NET: -204.1 mL            PHYSICAL EXAM:    GENERAL: NAD  HEAD: atraumatic, normocephalic   EYES: PERRL, white sclera.   ENMT: oral cavity moist  NECK: supple, no JVD  LYMPH: no palpable lymph nodes     SKIN: warm, dry   CHEST/LUNG: No Chest deformity , no chest tenderness. bilateral breath sounds, no  adventitious sounds  HEART: RRR, no m/r/g   ABDOMEN: soft, nontender, nondistended; bowel sounds present  EXTREMITIES: no edema, no cyanosis, no clubbing.  NEURO: AA0X3 , no focal neuro deficits        LABS:  CBC Full  -  ( 15 Aug 2018 07:03 )  WBC Count : 6.9 K/uL  Hemoglobin : 13.9 g/dL  Hematocrit : 42.1 %  Platelet Count - Automated : 213 K/uL  Mean Cell Volume : 89.5 fl  Mean Cell Hemoglobin : 29.6 pg  Mean Cell Hemoglobin Concentration : 33.1 gm/dL  Auto Neutrophil # : x  Auto Lymphocyte # : x  Auto Monocyte # : x  Auto Eosinophil # : x  Auto Basophil # : x  Auto Neutrophil % : x  Auto Lymphocyte % : x  Auto Monocyte % : x  Auto Eosinophil % : x  Auto Basophil % : x    08-15    138  |  104  |  16  ----------------------------<  x   3.5   |  24  |  x     Ca    8.4      15 Aug 2018 07:03  Mg     2.2     08-15    TPro  8.0  /  Alb  4.2  /  TBili  0.4  /  DBili  x   /  AST  25  /  ALT  50  /  AlkPhos  58  08-14    PT/INR - ( 14 Aug 2018 12:42 )   PT: 10.0 sec;   INR: 0.92 ratio         PTT - ( 14 Aug 2018 12:42 )  PTT:35.1 sec      CRITICAL CARE TIME SPENT: 35 minutes

## 2018-08-15 NOTE — PROGRESS NOTE ADULT - PROBLEM SELECTOR PLAN 2
RULED OUT: trop x 3 negative  Monitor on tele   Aspirin , statin , b-blocker   f/u Echocardiogram   likely needs Stress test likely based on symptoms after stabilization of BP  f/u cardiology consult - Dr. Glass

## 2018-08-15 NOTE — PROGRESS NOTE ADULT - ASSESSMENT
58 male with PMH of HTN ( labile BP , tried on multiple medications , finally stable on Metoprolol 100 bid ), ex-smoker , comes in with complaint of chest pressure , diaphoresis and sob . Patient was coming to see his relative , who is currently admitted in [facility] , was driving and suddenly felt all these symptoms. Patient describes it as a pressure on Left side of chest , radiating to left arm and numbness and cold sensation on left arm . BP was measured before coming in , was 240/129 in Left arm and 234/126 in right and he took his home medication metoprolol , but had no relief of symptoms , after which he decided to come to [facility]. Sob was described as that occurring with 2 flight of stairs.  Patient had a Stress test >1 year ago , negative. Was noted to have some arrythmia and was told by cardiologist to get Holter monitor , has not followed up yet. Patient was given aspirin 160 mg and Niro 0.4 mg *1 , and patient's symptoms improved. BP came down to 170/90 mm hg. However , while seeing patient , patient again went diaphoretic , started having chest pain and BP went up to 238/110 mm hg. Nitroglycerin drip was started.    Patient admitted to ICU for hypertensive emergency. s/p nitroglycerin drip

## 2018-08-16 ENCOUNTER — TRANSCRIPTION ENCOUNTER (OUTPATIENT)
Age: 58
End: 2018-08-16

## 2018-08-16 LAB
ANION GAP SERPL CALC-SCNC: 9 MMOL/L — SIGNIFICANT CHANGE UP (ref 5–17)
BUN SERPL-MCNC: 19 MG/DL — HIGH (ref 7–18)
CALCIUM SERPL-MCNC: 9 MG/DL — SIGNIFICANT CHANGE UP (ref 8.4–10.5)
CHLORIDE SERPL-SCNC: 100 MMOL/L — SIGNIFICANT CHANGE UP (ref 96–108)
CO2 SERPL-SCNC: 29 MMOL/L — SIGNIFICANT CHANGE UP (ref 22–31)
CREAT SERPL-MCNC: 1.28 MG/DL — SIGNIFICANT CHANGE UP (ref 0.5–1.3)
GLUCOSE SERPL-MCNC: 99 MG/DL — SIGNIFICANT CHANGE UP (ref 70–99)
HCT VFR BLD CALC: 47.8 % — SIGNIFICANT CHANGE UP (ref 39–50)
HGB BLD-MCNC: 15.2 G/DL — SIGNIFICANT CHANGE UP (ref 13–17)
MAGNESIUM SERPL-MCNC: 2.5 MG/DL — SIGNIFICANT CHANGE UP (ref 1.6–2.6)
MCHC RBC-ENTMCNC: 28.7 PG — SIGNIFICANT CHANGE UP (ref 27–34)
MCHC RBC-ENTMCNC: 31.9 GM/DL — LOW (ref 32–36)
MCV RBC AUTO: 89.9 FL — SIGNIFICANT CHANGE UP (ref 80–100)
PHOSPHATE SERPL-MCNC: 3.3 MG/DL — SIGNIFICANT CHANGE UP (ref 2.5–4.5)
PLATELET # BLD AUTO: 220 K/UL — SIGNIFICANT CHANGE UP (ref 150–400)
POTASSIUM SERPL-MCNC: 3.3 MMOL/L — LOW (ref 3.5–5.3)
POTASSIUM SERPL-SCNC: 3.3 MMOL/L — LOW (ref 3.5–5.3)
RBC # BLD: 5.31 M/UL — SIGNIFICANT CHANGE UP (ref 4.2–5.8)
RBC # FLD: 12.5 % — SIGNIFICANT CHANGE UP (ref 10.3–14.5)
SODIUM SERPL-SCNC: 138 MMOL/L — SIGNIFICANT CHANGE UP (ref 135–145)
WBC # BLD: 7.5 K/UL — SIGNIFICANT CHANGE UP (ref 3.8–10.5)
WBC # FLD AUTO: 7.5 K/UL — SIGNIFICANT CHANGE UP (ref 3.8–10.5)

## 2018-08-16 PROCEDURE — 93018 CV STRESS TEST I&R ONLY: CPT

## 2018-08-16 PROCEDURE — 78452 HT MUSCLE IMAGE SPECT MULT: CPT | Mod: 26

## 2018-08-16 PROCEDURE — 70450 CT HEAD/BRAIN W/O DYE: CPT | Mod: 26

## 2018-08-16 PROCEDURE — 99232 SBSQ HOSP IP/OBS MODERATE 35: CPT | Mod: 25

## 2018-08-16 PROCEDURE — 93016 CV STRESS TEST SUPVJ ONLY: CPT

## 2018-08-16 RX ORDER — METOPROLOL TARTRATE 50 MG
75 TABLET ORAL EVERY 12 HOURS
Qty: 0 | Refills: 0 | Status: DISCONTINUED | OUTPATIENT
Start: 2018-08-16 | End: 2018-08-17

## 2018-08-16 RX ORDER — LISINOPRIL 2.5 MG/1
5 TABLET ORAL ONCE
Qty: 0 | Refills: 0 | Status: COMPLETED | OUTPATIENT
Start: 2018-08-16 | End: 2018-08-16

## 2018-08-16 RX ORDER — LISINOPRIL 2.5 MG/1
5 TABLET ORAL ONCE
Qty: 0 | Refills: 0 | Status: DISCONTINUED | OUTPATIENT
Start: 2018-08-16 | End: 2018-08-16

## 2018-08-16 RX ORDER — LISINOPRIL 2.5 MG/1
10 TABLET ORAL DAILY
Qty: 0 | Refills: 0 | Status: DISCONTINUED | OUTPATIENT
Start: 2018-08-17 | End: 2018-08-17

## 2018-08-16 RX ORDER — ACETAMINOPHEN 500 MG
650 TABLET ORAL ONCE
Qty: 0 | Refills: 0 | Status: DISCONTINUED | OUTPATIENT
Start: 2018-08-16 | End: 2018-08-16

## 2018-08-16 RX ORDER — LISINOPRIL 2.5 MG/1
5 TABLET ORAL DAILY
Qty: 0 | Refills: 0 | Status: DISCONTINUED | OUTPATIENT
Start: 2018-08-16 | End: 2018-08-16

## 2018-08-16 RX ORDER — ONDANSETRON 8 MG/1
4 TABLET, FILM COATED ORAL ONCE
Qty: 0 | Refills: 0 | Status: COMPLETED | OUTPATIENT
Start: 2018-08-16 | End: 2018-08-16

## 2018-08-16 RX ORDER — POTASSIUM CHLORIDE 20 MEQ
40 PACKET (EA) ORAL ONCE
Qty: 0 | Refills: 0 | Status: COMPLETED | OUTPATIENT
Start: 2018-08-16 | End: 2018-08-16

## 2018-08-16 RX ADMIN — LISINOPRIL 5 MILLIGRAM(S): 2.5 TABLET ORAL at 05:34

## 2018-08-16 RX ADMIN — Medication 650 MILLIGRAM(S): at 22:50

## 2018-08-16 RX ADMIN — Medication 100 MILLIGRAM(S): at 05:36

## 2018-08-16 RX ADMIN — Medication 75 MILLIGRAM(S): at 17:53

## 2018-08-16 RX ADMIN — Medication 81 MILLIGRAM(S): at 13:34

## 2018-08-16 RX ADMIN — Medication 1.25 MILLIGRAM(S): at 07:39

## 2018-08-16 RX ADMIN — Medication 650 MILLIGRAM(S): at 21:55

## 2018-08-16 RX ADMIN — LISINOPRIL 5 MILLIGRAM(S): 2.5 TABLET ORAL at 13:34

## 2018-08-16 RX ADMIN — POLYETHYLENE GLYCOL 3350 17 GRAM(S): 17 POWDER, FOR SOLUTION ORAL at 13:35

## 2018-08-16 RX ADMIN — Medication 40 MILLIEQUIVALENT(S): at 13:27

## 2018-08-16 RX ADMIN — ATORVASTATIN CALCIUM 40 MILLIGRAM(S): 80 TABLET, FILM COATED ORAL at 21:55

## 2018-08-16 RX ADMIN — ONDANSETRON 4 MILLIGRAM(S): 8 TABLET, FILM COATED ORAL at 06:15

## 2018-08-16 NOTE — PROGRESS NOTE ADULT - PROBLEM SELECTOR PLAN 2
Chest pain and diaphoresis with left arm numbness   Concern for ACS   Michi score is 1   T1 negative , will trend T2 , T3   Monitor on tele   Aspirin , statin , b-blocker   Echocardiogram   Will need Stress test likely based on symptoms after stabilization of BP Chest pain and diaphoresis with left arm numbness   Concern for ACS   Michi score is 1   Serial cardiac enzymes negative.   Continue Aspirin , statin , b-blocker   F/U stress test.

## 2018-08-16 NOTE — DISCHARGE NOTE ADULT - PLAN OF CARE
< 130/80 mm of hg. You presented with chest pain, light headness and You presented with chest pain, light headedness and shortness of breath.  Your blood pressure was high. We gave you antihypertensive medication. Your EKG did not show signs of acute coronary syndrome. Your serial cardiac enzymes were normal.    We admitted you to ICU for hypertensive emergency. Appropriate medication was given to bring down blood pressure gradually.   Your CT head did not show any signs of intracranial hemorrhage.  Your Echocardiography showed mild left ventricular hypertrophy and grade 2 diastolic dysfunction.   We did stress test to rule out ischemic heart disease.  You should F/U with Cardiology physician Dr. Glass in 7-10 days. To rule out ACS Your EKG and serial cardiac enzymes were normal.  Echocardiography showed mild left ventricular hypertrophy and grade 2 diastolic dysfunction.  Stress test was done to rule out ischemia.  You should F/U with cardiology physician.

## 2018-08-16 NOTE — PROGRESS NOTE ADULT - SUBJECTIVE AND OBJECTIVE BOX
PGY 1 Note discussed with supervising resident and primary attending    Patient is a 58y old  Male who presents with a chief complaint of chest pain , diaphoresis (14 Aug 2018 15:36)      INTERVAL HPI/OVERNIGHT EVENTS: pt c/o mild headache relieved by medication.    MEDICATIONS  (STANDING):  aspirin  chewable 81 milliGRAM(s) Oral daily  atorvastatin 40 milliGRAM(s) Oral at bedtime  enoxaparin Injectable 40 milliGRAM(s) SubCutaneous daily  metoprolol tartrate 75 milliGRAM(s) Oral every 12 hours  polyethylene glycol 3350 17 Gram(s) Oral daily  senna 2 Tablet(s) Oral at bedtime    MEDICATIONS  (PRN):  acetaminophen   Tablet. 650 milliGRAM(s) Oral every 6 hours PRN Mild Pain (1 - 3)  nitroglycerin     SubLingual 0.4 milliGRAM(s) SubLingual every 5 minutes PRN Chest Pain      __________________________________________________  REVIEW OF SYSTEMS:    CONSTITUTIONAL: No fever,   EYES: no acute visual disturbances  NECK: No pain or stiffness  RESPIRATORY: No cough; No shortness of breath  CARDIOVASCULAR: No chest pain, no palpitations  GASTROINTESTINAL: No pain. No nausea or vomiting; No diarrhea   NEUROLOGICAL: No headache or numbness, no tremors  MUSCULOSKELETAL: No joint pain, no muscle pain  GENITOURINARY: no dysuria, no frequency, no hesitancy  PSYCHIATRY: no depression , no anxiety  ALL OTHER  ROS negative        Vital Signs Last 24 Hrs  T(C): 36.4 (16 Aug 2018 13:20), Max: 36.8 (15 Aug 2018 15:30)  T(F): 97.6 (16 Aug 2018 13:20), Max: 98.3 (15 Aug 2018 15:30)  HR: 94 (16 Aug 2018 13:20) (52 - 108)  BP: 151/91 (16 Aug 2018 13:20) (133/79 - 178/105)  BP(mean): 102 (15 Aug 2018 20:00) (84 - 102)  RR: 17 (16 Aug 2018 13:20) (15 - 23)  SpO2: 94% (16 Aug 2018 13:20) (91% - 98%)    ________________________________________________  PHYSICAL EXAM:  GENERAL: NAD  HEENT: Normocephalic;  conjunctivae and sclerae clear; moist mucous membranes;   NECK : supple  CHEST/LUNG: Clear to auscultation bilaterally with good air entry   HEART: S1 S2  regular; no murmurs, gallops or rubs  ABDOMEN: Soft, Nontender, Nondistended; Bowel sounds present  EXTREMITIES: no cyanosis; no edema; no calf tenderness  SKIN: warm and dry; no rash  NERVOUS SYSTEM:  Awake and alert; Oriented  to place, person and time ; no new deficits    _________________________________________________  LABS:                        15.2   7.5   )-----------( 220      ( 16 Aug 2018 06:29 )             47.8     08-16    138  |  100  |  19<H>  ----------------------------<  99  3.3<L>   |  29  |  1.28    Ca    9.0      16 Aug 2018 06:29  Phos  3.3     08-16  Mg     2.5     08-16          CAPILLARY BLOOD GLUCOSE            RADIOLOGY & ADDITIONAL TESTS:    Imaging Personally Reviewed:  YES/NO    Consultant(s) Notes Reviewed:   YES/ No    Care Discussed with Consultants :     Plan of care was discussed with patient and /or primary care giver; all questions and concerns were addressed and care was aligned with patient's wishes. PGY 1 Note discussed with supervising resident and primary attending    Patient is a 58y old  Male who presents with a chief complaint of chest pain , diaphoresis (14 Aug 2018 15:36)      INTERVAL HPI/OVERNIGHT EVENTS: pt c/o mild headache relieved by medication.    MEDICATIONS  (STANDING):  aspirin  chewable 81 milliGRAM(s) Oral daily  atorvastatin 40 milliGRAM(s) Oral at bedtime  enoxaparin Injectable 40 milliGRAM(s) SubCutaneous daily  metoprolol tartrate 75 milliGRAM(s) Oral every 12 hours  polyethylene glycol 3350 17 Gram(s) Oral daily  senna 2 Tablet(s) Oral at bedtime    MEDICATIONS  (PRN):  acetaminophen   Tablet. 650 milliGRAM(s) Oral every 6 hours PRN Mild Pain (1 - 3)  nitroglycerin     SubLingual 0.4 milliGRAM(s) SubLingual every 5 minutes PRN Chest Pain      __________________________________________________  REVIEW OF SYSTEMS:    CONSTITUTIONAL: No fever,   EYES: no acute visual disturbances  NECK: No pain or stiffness  RESPIRATORY: No cough; No shortness of breath  CARDIOVASCULAR: No chest pain, no palpitations  GASTROINTESTINAL: No pain. No nausea or vomiting; No diarrhea   NEUROLOGICAL: Mild  headache present.  No numbness, no tremors. No focal deficit.  MUSCULOSKELETAL: No joint pain, no muscle pain  GENITOURINARY: no dysuria, no frequency, no hesitancy  PSYCHIATRY: no depression , no anxiety  ALL OTHER  ROS negative        Vital Signs Last 24 Hrs  T(C): 36.4 (16 Aug 2018 13:20), Max: 36.8 (15 Aug 2018 15:30)  T(F): 97.6 (16 Aug 2018 13:20), Max: 98.3 (15 Aug 2018 15:30)  HR: 94 (16 Aug 2018 13:20) (52 - 108)  BP: 151/91 (16 Aug 2018 13:20) (133/79 - 178/105)  BP(mean): 102 (15 Aug 2018 20:00) (84 - 102)  RR: 17 (16 Aug 2018 13:20) (15 - 23)  SpO2: 94% (16 Aug 2018 13:20) (91% - 98%)    ________________________________________________  PHYSICAL EXAM:  GENERAL: NAD  HEENT: Normocephalic;  conjunctivae and sclerae clear; moist mucous membranes;   NECK : supple  CHEST/LUNG: Clear to auscultation bilaterally with good air entry   HEART: S1 S2  regular; no murmurs, gallops or rubs  ABDOMEN: Soft, Nontender, Nondistended; Bowel sounds present  EXTREMITIES: no cyanosis; no edema; no calf tenderness  SKIN: warm and dry; no rash  NERVOUS SYSTEM:  Awake and alert; Oriented  to place, person and time ; no new deficits    _________________________________________________  LABS:                        15.2   7.5   )-----------( 220      ( 16 Aug 2018 06:29 )             47.8     08-16    138  |  100  |  19<H>  ----------------------------<  99  3.3<L>   |  29  |  1.28    Ca    9.0      16 Aug 2018 06:29  Phos  3.3     08-16  Mg     2.5     08-16          CAPILLARY BLOOD GLUCOSE            RADIOLOGY & ADDITIONAL TESTS:< from: CT Head No Cont (08.16.18 @ 10:03) >    EXAM:  CT BRAIN                            PROCEDURE DATE:  08/16/2018          INTERPRETATION:  CLINICAL STATEMENT: Hypertensive emergency. Pain    TECHNIQUE: CT of the head was performed without IV contrast.    COMPARISON: None.    FINDINGS:  There is mild diffuse parenchymal volume loss. There are areas of low   attenuation in the periventricular white matter likely related to mild   chronic microvascular ischemic changes.    There is no acute intracranial hemorrhage, parenchymal mass, mass effect   or midline shift. There is no acute territorial infarct. There is no   hydrocephalus. Hyperdensity of the left transverse sinus likely within   normal limits.    The cranium is intact.         Mild mucosal thickening ethmoid sinus.    IMPRESSION:  No acute intracranial hemorrhage or acute territorial infarct. If   symptoms persist, MRI exam recommended            Imaging Personally Reviewed:  YES    Consultant(s) Notes Reviewed:   YES    Care Discussed with Consultants : Yes    Plan of care was discussed with patient and /or primary care giver; all questions and concerns were addressed and care was aligned with patient's wishes. PGY 1 Note discussed with supervising resident and primary attending    Patient is a 58y old  Male who presents with a chief complaint of chest pain , diaphoresis (14 Aug 2018 15:36)      INTERVAL HPI/OVERNIGHT EVENTS: pt c/o mild headache relieved by medication.    MEDICATIONS  (STANDING):  aspirin  chewable 81 milliGRAM(s) Oral daily  atorvastatin 40 milliGRAM(s) Oral at bedtime  enoxaparin Injectable 40 milliGRAM(s) SubCutaneous daily  metoprolol tartrate 75 milliGRAM(s) Oral every 12 hours  polyethylene glycol 3350 17 Gram(s) Oral daily  senna 2 Tablet(s) Oral at bedtime    MEDICATIONS  (PRN):  acetaminophen   Tablet. 650 milliGRAM(s) Oral every 6 hours PRN Mild Pain (1 - 3)  nitroglycerin     SubLingual 0.4 milliGRAM(s) SubLingual every 5 minutes PRN Chest Pain      __________________________________________________  REVIEW OF SYSTEMS:    CONSTITUTIONAL: No fever,   EYES: no acute visual disturbances  NECK: No pain or stiffness  RESPIRATORY: No cough; No shortness of breath  CARDIOVASCULAR: No chest pain, no palpitations  GASTROINTESTINAL: No pain. No nausea or vomiting; No diarrhea   NEUROLOGICAL: Mild  headache present.  No numbness, no tremors. No focal deficit.  MUSCULOSKELETAL: No joint pain, no muscle pain  GENITOURINARY: no dysuria, no frequency, no hesitancy  PSYCHIATRY: no depression , no anxiety  ALL OTHER  ROS negative        Vital Signs Last 24 Hrs  T(C): 36.4 (16 Aug 2018 13:20), Max: 36.8 (15 Aug 2018 15:30)  T(F): 97.6 (16 Aug 2018 13:20), Max: 98.3 (15 Aug 2018 15:30)  HR: 94 (16 Aug 2018 13:20) (52 - 108)  BP: 151/91 (16 Aug 2018 13:20) (133/79 - 178/105)  BP(mean): 102 (15 Aug 2018 20:00) (84 - 102)  RR: 17 (16 Aug 2018 13:20) (15 - 23)  SpO2: 94% (16 Aug 2018 13:20) (91% - 98%)    ________________________________________________  PHYSICAL EXAM:  GENERAL: NAD  HEENT: Normocephalic;  conjunctivae and sclerae clear; moist mucous membranes;   NECK : supple  CHEST/LUNG: Clear to auscultation bilaterally with good air entry   HEART: S1 S2  regular; no murmurs, gallops or rubs  ABDOMEN: Soft, Nontender, Nondistended; Bowel sounds present  EXTREMITIES: no cyanosis; no edema; no calf tenderness  SKIN: warm and dry; no rash  NERVOUS SYSTEM:  Awake and alert; Oriented  to place, person and time ; no new deficits    _________________________________________________  LABS:                        15.2   7.5   )-----------( 220      ( 16 Aug 2018 06:29 )             47.8     08-16    138  |  100  |  19<H>  ----------------------------<  99  3.3<L>   |  29  |  1.28    Ca    9.0      16 Aug 2018 06:29  Phos  3.3     08-16  Mg     2.5     08-16          CAPILLARY BLOOD GLUCOSE            RADIOLOGY & ADDITIONAL TESTS:   CT Head No Cont (08.16.18 @ 10:03) >    EXAM:  CT BRAIN                            PROCEDURE DATE:  08/16/2018          INTERPRETATION:  CLINICAL STATEMENT: Hypertensive emergency. Pain    TECHNIQUE: CT of the head was performed without IV contrast.    COMPARISON: None.    FINDINGS:  There is mild diffuse parenchymal volume loss. There are areas of low   attenuation in the periventricular white matter likely related to mild   chronic microvascular ischemic changes.    There is no acute intracranial hemorrhage, parenchymal mass, mass effect   or midline shift. There is no acute territorial infarct. There is no   hydrocephalus. Hyperdensity of the left transverse sinus likely within   normal limits.    The cranium is intact.         Mild mucosal thickening ethmoid sinus.    IMPRESSION:  No acute intracranial hemorrhage or acute territorial infarct. If   symptoms persist, MRI exam recommended            Imaging Personally Reviewed:  YES    Consultant(s) Notes Reviewed:   YES    Care Discussed with Consultants : Yes    Plan of care was discussed with patient and /or primary care giver; all questions and concerns were addressed and care was aligned with patient's wishes.

## 2018-08-16 NOTE — DISCHARGE NOTE ADULT - CARE PROVIDER_API CALL
Robby Glass), Cardiovascular Disease; Internal Medicine  [address] [place] [address]  Phone: [phone]  Fax: [phone]

## 2018-08-16 NOTE — PROGRESS NOTE ADULT - SUBJECTIVE AND OBJECTIVE BOX
PRESENTING CC: Chest pain    SUBJ:     In summary, this is a 57 yo M former smoker with HTN who presented with chest pain in the setting of hypertensive urgency (202/111 initial BP).    Overnight, there were no acute events. Patient was downgraded from the ICU.       ----------------------  Description of patient's presenting symptoms from my prior note: Patient reports he was driving to the hospital on 8/14 to visit a family member when he felt chest pain en route. This was left sided, pressure-like in nature, radiating to the left arm and associated with diuresis. No dyspnea or lightheadedness. He also felt his left arm numbness.     PMH: As above, also S/P meniscectomy, S/P appendectomy    Allergies    No Known Allergies    MEDICATIONS  (STANDING):  aspirin  chewable 81 milliGRAM(s) Oral daily  atorvastatin 40 milliGRAM(s) Oral at bedtime  enoxaparin Injectable 40 milliGRAM(s) SubCutaneous daily  lisinopril 5 milliGRAM(s) Oral daily  metoprolol tartrate 100 milliGRAM(s) Oral every 12 hours  polyethylene glycol 3350 17 Gram(s) Oral daily  senna 2 Tablet(s) Oral at bedtime    MEDICATIONS  (PRN):  acetaminophen   Tablet. 650 milliGRAM(s) Oral every 6 hours PRN Mild Pain (1 - 3)  nitroglycerin     SubLingual 0.4 milliGRAM(s) SubLingual every 5 minutes PRN Chest Pain      FAMILY HISTORY:  Family history of essential hypertension (Mother)    Reviewed; no change from my prior note    SOCIAL HISTORY:  Reviewed, no change from my prior note    REVIEW OF SYSTEMS:  Constitutional: [ ] fever, [ ]weight loss,  [ ]fatigue  Eyes: [ ] visual changes  Respiratory: [ ]shortness of breath;  [ ] cough, [ ]wheezing, [ ]chills, [ ]hemoptysis  Cardiovascular: [ ] chest pain, [ ]palpitations, [ ]dizziness,  [ ]leg swelling [ ]syncope  Gastrointestinal: [ ] abdominal pain, [ ]nausea, [ ]vomiting,  [ ]diarrhea   Genitourinary: [ ] dysuria, [ ] hematuria  Neurologic: [ ] headaches [ ] tremors [ ] weakness [ ] lightheadedness  Skin: [ ] itching, [ ]burning, [ ] rashes  Endocrine: [ ] heat or cold intolerance  Musculoskeletal: [ ] joint pain or swelling; [ ] muscle, back, or extremity pain  Psychiatric: [ ] depression, [ ]anxiety, [ ]mood swings, or [ ]difficulty sleeping  Hematologic: [ ] easy bruising, [ ] bleeding gums    [x] All remaining systems negative except as per above.   [  ] Unable to obtain    Vital Signs Last 24 Hrs  T(C): 36.7 (16 Aug 2018 08:02), Max: 36.8 (15 Aug 2018 15:30)  T(F): 98 (16 Aug 2018 08:02), Max: 98.3 (15 Aug 2018 15:30)  HR: 52 (16 Aug 2018 08:02) (52 - 74)  BP: 143/87 (16 Aug 2018 08:02) (103/64 - 178/105)  BP(mean): 102 (15 Aug 2018 20:00) (74 - 110)  RR: 17 (16 Aug 2018 08:02) (9 - 23)  SpO2: 95% (16 Aug 2018 08:02) (91% - 98%)  I&O's Summary    15 Aug 2018 07:01  -  16 Aug 2018 07:00  --------------------------------------------------------  IN: 840 mL / OUT: 0 mL / NET: 840 mL    PHYSICAL EXAM:  General: No acute distress  HEENT: EOMI, PERRL  Neck: Supple, No JVD  Lungs: Clear to auscultation bilaterally; No rales or wheezing  Heart: Regular rate and rhythm; No murmurs, rubs, or gallops  Abdomen: Nontender, bowel sounds present  Extremities: No clubbing, cyanosis, or edema  Nervous system:  Alert & Oriented X3, no focal deficits  Psychiatric: Normal affect  Skin: No rashes or lesions    LABS:  08-16    138  |  100  |  19<H>  ----------------------------<  99  3.3<L>   |  29  |  1.28    Ca    9.0      16 Aug 2018 06:29  Phos  3.3     08-16  Mg     2.5     08-16    TPro  8.0  /  Alb  4.2  /  TBili  0.4  /  DBili  x   /  AST  25  /  ALT  50  /  AlkPhos  58  08-14    Creatinine Trend: 1.28<--, 1.23<--, 1.29<--                        15.2   7.5   )-----------( 220      ( 16 Aug 2018 06:29 )             47.8     PT/INR - ( 14 Aug 2018 12:42 )   PT: 10.0 sec;   INR: 0.92 ratio         PTT - ( 14 Aug 2018 12:42 )  PTT:35.1 sec    Lipid Panel:     HDL 50    Trigs 146    Cardiac Enzymes: CARDIAC MARKERS ( 15 Aug 2018 07:03 )  <0.015 ng/mL / x     / 119 U/L / x     / <1.0 ng/mL  CARDIAC MARKERS ( 14 Aug 2018 15:37 )  <0.015 ng/mL / x     / 182 U/L / x     / 1.7 ng/mL  CARDIAC MARKERS ( 14 Aug 2018 12:42 )  <0.015 ng/mL / x     / 180 U/L / x     / 1.8 ng/mL      Serum Pro-Brain Natriuretic Peptide: 36 pg/mL (08-14-18 @ 12:42)      TELEMETRY:    ECHO: < from: Transthoracic Echocardiogram (08.14.18 @ 16:43) >  CONCLUSIONS:  1. Mild concentric left ventricular hypertrophy.  2. Normal Left Ventricular Systolic Function,  (EF = 55 to  60%)  3. Grade II diastolic dysfunction.    < end of copied text >            IMPRESSION AND PLAN: PRESENTING CC: Chest pain    SUBJ:     In summary, this is a 57 yo M former smoker with HTN who presented with chest pain in the setting of hypertensive urgency (202/111 initial BP).    Overnight, there were no acute events. Patient was downgraded from the ICU. Occasionally has headache, improved from yesterday.     ----------------------  Description of patient's presenting symptoms from my prior note: Patient reports he was driving to the hospital on 8/14 to visit a family member when he felt chest pain en route. This was left sided, pressure-like in nature, radiating to the left arm and associated with diuresis. No dyspnea or lightheadedness. He also felt his left arm numbness.     PMH: As above, also S/P meniscectomy, S/P appendectomy    Allergies    No Known Allergies    MEDICATIONS  (STANDING):  aspirin  chewable 81 milliGRAM(s) Oral daily  atorvastatin 40 milliGRAM(s) Oral at bedtime  enoxaparin Injectable 40 milliGRAM(s) SubCutaneous daily  lisinopril 5 milliGRAM(s) Oral daily  metoprolol tartrate 100 milliGRAM(s) Oral every 12 hours  polyethylene glycol 3350 17 Gram(s) Oral daily  senna 2 Tablet(s) Oral at bedtime    MEDICATIONS  (PRN):  acetaminophen   Tablet. 650 milliGRAM(s) Oral every 6 hours PRN Mild Pain (1 - 3)  nitroglycerin     SubLingual 0.4 milliGRAM(s) SubLingual every 5 minutes PRN Chest Pain      FAMILY HISTORY:  Family history of essential hypertension (Mother)    Reviewed; no change from my prior note    SOCIAL HISTORY:  Reviewed, no change from my prior note    REVIEW OF SYSTEMS:  Constitutional: [ ] fever, [ ]weight loss,  [ ]fatigue  Eyes: [ ] visual changes  Respiratory: [ ]shortness of breath;  [ ] cough, [ ]wheezing, [ ]chills, [ ]hemoptysis  Cardiovascular: [ ] chest pain, [ ]palpitations, [ ]dizziness,  [ ]leg swelling [ ]syncope  Gastrointestinal: [ ] abdominal pain, [ ]nausea, [ ]vomiting,  [ ]diarrhea   Genitourinary: [ ] dysuria, [ ] hematuria  Neurologic: [ ] headaches [ ] tremors [ ] weakness [ ] lightheadedness  Skin: [ ] itching, [ ]burning, [ ] rashes  Endocrine: [ ] heat or cold intolerance  Musculoskeletal: [ ] joint pain or swelling; [ ] muscle, back, or extremity pain  Psychiatric: [ ] depression, [ ]anxiety, [ ]mood swings, or [ ]difficulty sleeping  Hematologic: [ ] easy bruising, [ ] bleeding gums    [x] All remaining systems negative except as per above.   [  ] Unable to obtain    Vital Signs Last 24 Hrs  T(C): 36.7 (16 Aug 2018 08:02), Max: 36.8 (15 Aug 2018 15:30)  T(F): 98 (16 Aug 2018 08:02), Max: 98.3 (15 Aug 2018 15:30)  HR: 52 (16 Aug 2018 08:02) (52 - 74)  BP: 143/87 (16 Aug 2018 08:02) (103/64 - 178/105)  BP(mean): 102 (15 Aug 2018 20:00) (74 - 110)  RR: 17 (16 Aug 2018 08:02) (9 - 23)  SpO2: 95% (16 Aug 2018 08:02) (91% - 98%)  I&O's Summary    15 Aug 2018 07:01  -  16 Aug 2018 07:00  --------------------------------------------------------  IN: 840 mL / OUT: 0 mL / NET: 840 mL    PHYSICAL EXAM:  General: No acute distress  HEENT: EOMI, PERRL  Neck: Supple, No JVD  Lungs: Clear to auscultation bilaterally; No rales or wheezing  Heart: Regular rate and rhythm; No murmurs, rubs, or gallops  Abdomen: Nontender, bowel sounds present  Extremities: No clubbing, cyanosis, or edema  Nervous system:  Alert & Oriented X3, no focal deficits  Psychiatric: Normal affect  Skin: No rashes or lesions    LABS:  08-16    138  |  100  |  19<H>  ----------------------------<  99  3.3<L>   |  29  |  1.28    Ca    9.0      16 Aug 2018 06:29  Phos  3.3     08-16  Mg     2.5     08-16    TPro  8.0  /  Alb  4.2  /  TBili  0.4  /  DBili  x   /  AST  25  /  ALT  50  /  AlkPhos  58  08-14    Creatinine Trend: 1.28<--, 1.23<--, 1.29<--                        15.2   7.5   )-----------( 220      ( 16 Aug 2018 06:29 )             47.8     PT/INR - ( 14 Aug 2018 12:42 )   PT: 10.0 sec;   INR: 0.92 ratio         PTT - ( 14 Aug 2018 12:42 )  PTT:35.1 sec    Lipid Panel:     HDL 50    Trigs 146    Cardiac Enzymes: CARDIAC MARKERS ( 15 Aug 2018 07:03 )  <0.015 ng/mL / x     / 119 U/L / x     / <1.0 ng/mL  CARDIAC MARKERS ( 14 Aug 2018 15:37 )  <0.015 ng/mL / x     / 182 U/L / x     / 1.7 ng/mL  CARDIAC MARKERS ( 14 Aug 2018 12:42 )  <0.015 ng/mL / x     / 180 U/L / x     / 1.8 ng/mL      Serum Pro-Brain Natriuretic Peptide: 36 pg/mL (08-14-18 @ 12:42)    CT HEAD:    < from: CT Head No Cont (08.16.18 @ 10:03) >  IMPRESSION:  No acute intracranial hemorrhage or acute territorial infarct. If   symptoms persist, MRI exam recommended    < end of copied text >      ECHO: < from: Transthoracic Echocardiogram (08.14.18 @ 16:43) >  CONCLUSIONS:  1. Mild concentric left ventricular hypertrophy.  2. Normal Left Ventricular Systolic Function,  (EF = 55 to  60%)  3. Grade II diastolic dysfunction.    < end of copied text >

## 2018-08-16 NOTE — CHART NOTE - NSCHARTNOTEFT_GEN_A_CORE
Patient's BP is constantly running above 160. Will give a stat dose IV Vasotec 1.25mg. He is currently on metoprolol 100mg BID and Lisinopril 5mg. May consider going up on the dose of Lisinopril.  Will monitor for now.

## 2018-08-16 NOTE — PROGRESS NOTE ADULT - ASSESSMENT
58 male with PMH of HTN ( labile BP , tried on multiple medications , finally stable on Metoprolol 100 bid ) , came  in with complaint of chest pressure , diaphoresis and sob . Patient in F Patient described it as a pressure on Left side of chest , radiating to left arm and numbness and cold sensation on left arm . BP was measured before coming in , was 240/129 in Left arm and 234/126 in right and he took his home medication metoprolol , but had no relief of symptoms , after which he decided to come to Er. Denies any nausea , vomiting , abdominal pain .   Pt was admitted to ICU with diagnosis of hypertensive emergency. Pt  was downgraded to floor from ICU yesterday(8/15).  Pt was initially started on nitroglycerin drip and titrated off, started on lisinopril PO and metoprolol. CT head scan was normal today and we are following stress test. Patient has labile BP since many years and initial labs revealed  hypokalemia.

## 2018-08-16 NOTE — PROGRESS NOTE ADULT - PROBLEM SELECTOR PLAN 1
Patient coming in elevated BP , chest pressure , diaphoresis   Nitroglycerin drip was started and  titrated for BP of 180/90 mm hg for first 24 hours   Serial cardiac enzymes were negative.   EKG showed  LVH , no St- T wave changes .  Echo showed mild left ventricular hypertrophy and grade 2 diastolic dysfunction.  Renin, aldosterone, cortisol levels all normal.  Cardiology recommendations noted.  Renal artery duplex as outpatient .  Pt is no Metoprolol Patient coming in elevated BP , chest pressure , diaphoresis   Nitroglycerin drip was started and  titrated for BP of 180/90 mm hg for first 24 hours   Serial cardiac enzymes were negative.   EKG showed  LVH , no St- T wave changes .  Echo showed mild left ventricular hypertrophy and grade 2 diastolic dysfunction.  Renin, aldosterone, cortisol levels all normal.  Cardiology recommendations noted.  Continue statin.  Renal artery duplex as outpatient .  Pt is no Metoprolol 75 mg daily and lisinopril 10 mg daily.  CT head was normal.  F/U Stress test.

## 2018-08-16 NOTE — DISCHARGE NOTE ADULT - CARE PLAN
Principal Discharge DX:	Hypertensive emergency  Goal:	< 130/80 mm of hg.  Assessment and plan of treatment:	You presented with chest pain, light headness and  Secondary Diagnosis:	ACS (acute coronary syndrome) Principal Discharge DX:	Hypertensive emergency  Goal:	< 130/80 mm of hg.  Assessment and plan of treatment:	You presented with chest pain, light headedness and shortness of breath.  Your blood pressure was high. We gave you antihypertensive medication. Your EKG did not show signs of acute coronary syndrome. Your serial cardiac enzymes were normal.    We admitted you to ICU for hypertensive emergency. Appropriate medication was given to bring down blood pressure gradually.   Your CT head did not show any signs of intracranial hemorrhage.  Your Echocardiography showed mild left ventricular hypertrophy and grade 2 diastolic dysfunction.   We did stress test to rule out ischemic heart disease.  You should F/U with Cardiology physician Dr. Glass in 7-10 days.  Secondary Diagnosis:	ACS (acute coronary syndrome)  Goal:	To rule out ACS  Assessment and plan of treatment:	Your EKG and serial cardiac enzymes were normal.  Echocardiography showed mild left ventricular hypertrophy and grade 2 diastolic dysfunction.  Stress test was done to rule out ischemia.  You should F/U with cardiology physician.

## 2018-08-16 NOTE — DISCHARGE NOTE ADULT - HOSPITAL COURSE
57 y/o male with history of hypertension presented to the ED with chest pain, diaphoresis and lightheadedness. Found to be hypertensive in the ED. Was given Labetalol IV push with out resolution of symptoms so was stared on Nitroglycerine drip. No EKG changes to suggest NSTEMI. Cardiac enzymes were negative. No focal neurologic deficits noted.  He was  admitted  to ICU for Hypertensive URGENCY. His home medication metoprolol was continued and  Oral lisinopril started. Then, transitioned  off nitroglycerine drip.  Echocardiogram showed mild left ventricular hypertension and grade 2 diastolic dysfunction .    Plan: Patient coming in elevated BP , chest pressure , diaphoresis   Will start on Nitroglycerin drip , will titrate for BP of 180/90 mm hg for first 24 hours   T1 was negative , Will trend troponin  EKG shows LVH , no St- T wave changes , normal intervals *2   Start on aspirin , statin   Continue with home dose Metoprolol 100 bid   Start on ACE , Lisinopril 5 mg Od   Dash diet   Get echocardiogram   Consult cardio , Dr. Glass   Off note , patient has labile BP since many years and initial labs reveal hypokalemia   Can suspect Adrenal cause   Will send renin , aldosterone , am cortisol.      Problem/Plan - 2:  ·  Problem: R/O ACS (acute coronary syndrome).  Plan: Chest pain and diaphoresis with left arm numbness   Concern for ACS   Michi score is 1   T1 negative , will trend T2 , T3   Monitor on tele   Aspirin , statin , b-blocker   Echocardiogram   Will need Stress test likely based on symptoms after stabilization of BP. 57 y/o male with history of hypertension presented to the ED with chest pain, diaphoresis and lightheadedness. Found to be hypertensive in the ED. Was given Labetalol IV push with out resolution of symptoms so was stared on Nitroglycerine drip. Patient has labile BP since many years and initial labs revealed  hypokalemia. No EKG changes to suggest NSTEMI. Cardiac enzymes were negative. No focal neurologic deficits noted. Patient's 10-year risk of atherosclerotic cardiovascular disease (ASCVD) is 12.8% based on the pooled cohort equations. He was  admitted  to ICU for Hypertensive URGENCY. His home medication metoprolol was continued and  Oral lisinopril started. Then, transitioned  off nitroglycerine drip.  Echocardiogram showed mild left ventricular hypertension and grade 2 diastolic dysfunction . Aspirin and  statin was started. Renal aldosterone, cortisol levels were normal. CT head did not show any signs of intracranial hemorrhage. 59 y/o male with history of hypertension presented to the ED with chest pain, diaphoresis and lightheadedness. Found to be hypertensive in the ED. Was given Labetalol IV push with out resolution of symptoms so was stared on Nitroglycerine drip. Patient has labile BP since many years and initial labs revealed  hypokalemia. No EKG changes to suggest NSTEMI. Cardiac enzymes were negative. No focal neurologic deficits noted. Patient's 10-year risk of atherosclerotic cardiovascular disease (ASCVD) is 12.8% based on the pooled cohort equations. He was  admitted  to ICU for Hypertensive URGENCY. His home medication metoprolol was continued and  Oral lisinopril started. Then, transitioned  off nitroglycerine drip.  Echocardiogram showed mild left ventricular hypertension and grade 2 diastolic dysfunction . Aspirin and  statin was started. Renal aldosterone, cortisol levels were normal. CT head did not show any signs of intracranial hemorrhage. Stress test was normal. Pt will f/u Dr. Glass on outpatient basis. Case discussed with the attending. Pt is stable for discharge. This is just a summary of the case. For further information please refer to the pt. chart document.

## 2018-08-16 NOTE — DISCHARGE NOTE ADULT - PATIENT PORTAL LINK FT
You can access the [facility] Patient Portal, offered by [facility], by registering with the following website: [email or web address]

## 2018-08-16 NOTE — PROGRESS NOTE ADULT - PROBLEM SELECTOR PLAN 3
IMPROVE VTE Individual Risk Assessment          RISK                                                          Points  [  ] Previous VTE                                                3  [  ] Thrombophilia                                             2  [  ] Lower limb paralysis                                   2        (unable to hold up >15 seconds)    [  ] Current Cancer                                             2         (within 6 months)  [ x ] Immobilization > 24 hrs                              1  [ x ] ICU/CCU stay > 24 hours                             1  [  ] Age > 60                                                         1    IMPROVE VTE Score: 2   Will start on Levonox for dvt ppx
IMPROVE VTE Individual Risk Assessment          RISK                                                          Points  [  ] Previous VTE                                                3  [  ] Thrombophilia                                             2  [  ] Lower limb paralysis                                   2        (unable to hold up >15 seconds)    [  ] Current Cancer                                             2         (within 6 months)  [ x ] Immobilization > 24 hrs                              1  [ x ] ICU/CCU stay > 24 hours                             1  [  ] Age > 60                                                         1    IMPROVE VTE Score: 2   Will start on Levonox for dvt ppx

## 2018-08-17 VITALS — DIASTOLIC BLOOD PRESSURE: 84 MMHG | HEART RATE: 75 BPM | SYSTOLIC BLOOD PRESSURE: 140 MMHG

## 2018-08-17 LAB
ANION GAP SERPL CALC-SCNC: 7 MMOL/L — SIGNIFICANT CHANGE UP (ref 5–17)
BUN SERPL-MCNC: 20 MG/DL — HIGH (ref 7–18)
CALCIUM SERPL-MCNC: 8.7 MG/DL — SIGNIFICANT CHANGE UP (ref 8.4–10.5)
CHLORIDE SERPL-SCNC: 104 MMOL/L — SIGNIFICANT CHANGE UP (ref 96–108)
CO2 SERPL-SCNC: 28 MMOL/L — SIGNIFICANT CHANGE UP (ref 22–31)
CREAT SERPL-MCNC: 1.27 MG/DL — SIGNIFICANT CHANGE UP (ref 0.5–1.3)
GLUCOSE SERPL-MCNC: 98 MG/DL — SIGNIFICANT CHANGE UP (ref 70–99)
HCT VFR BLD CALC: 44.4 % — SIGNIFICANT CHANGE UP (ref 39–50)
HGB BLD-MCNC: 14.4 G/DL — SIGNIFICANT CHANGE UP (ref 13–17)
MAGNESIUM SERPL-MCNC: 2.4 MG/DL — SIGNIFICANT CHANGE UP (ref 1.6–2.6)
MCHC RBC-ENTMCNC: 29.1 PG — SIGNIFICANT CHANGE UP (ref 27–34)
MCHC RBC-ENTMCNC: 32.3 GM/DL — SIGNIFICANT CHANGE UP (ref 32–36)
MCV RBC AUTO: 89.9 FL — SIGNIFICANT CHANGE UP (ref 80–100)
PHOSPHATE SERPL-MCNC: 3.1 MG/DL — SIGNIFICANT CHANGE UP (ref 2.5–4.5)
PLATELET # BLD AUTO: 217 K/UL — SIGNIFICANT CHANGE UP (ref 150–400)
POTASSIUM SERPL-MCNC: 4.8 MMOL/L — SIGNIFICANT CHANGE UP (ref 3.5–5.3)
POTASSIUM SERPL-SCNC: 4.8 MMOL/L — SIGNIFICANT CHANGE UP (ref 3.5–5.3)
RBC # BLD: 4.94 M/UL — SIGNIFICANT CHANGE UP (ref 4.2–5.8)
RBC # FLD: 12.3 % — SIGNIFICANT CHANGE UP (ref 10.3–14.5)
SODIUM SERPL-SCNC: 139 MMOL/L — SIGNIFICANT CHANGE UP (ref 135–145)
WBC # BLD: 6.2 K/UL — SIGNIFICANT CHANGE UP (ref 3.8–10.5)
WBC # FLD AUTO: 6.2 K/UL — SIGNIFICANT CHANGE UP (ref 3.8–10.5)

## 2018-08-17 PROCEDURE — 83880 ASSAY OF NATRIURETIC PEPTIDE: CPT

## 2018-08-17 PROCEDURE — 93017 CV STRESS TEST TRACING ONLY: CPT

## 2018-08-17 PROCEDURE — 85027 COMPLETE CBC AUTOMATED: CPT

## 2018-08-17 PROCEDURE — 70450 CT HEAD/BRAIN W/O DYE: CPT

## 2018-08-17 PROCEDURE — 82550 ASSAY OF CK (CPK): CPT

## 2018-08-17 PROCEDURE — 80053 COMPREHEN METABOLIC PANEL: CPT

## 2018-08-17 PROCEDURE — 82533 TOTAL CORTISOL: CPT

## 2018-08-17 PROCEDURE — 82024 ASSAY OF ACTH: CPT

## 2018-08-17 PROCEDURE — 84244 ASSAY OF RENIN: CPT

## 2018-08-17 PROCEDURE — 83036 HEMOGLOBIN GLYCOSYLATED A1C: CPT

## 2018-08-17 PROCEDURE — 80061 LIPID PANEL: CPT

## 2018-08-17 PROCEDURE — 93005 ELECTROCARDIOGRAM TRACING: CPT

## 2018-08-17 PROCEDURE — 82553 CREATINE MB FRACTION: CPT

## 2018-08-17 PROCEDURE — 80307 DRUG TEST PRSMV CHEM ANLYZR: CPT

## 2018-08-17 PROCEDURE — 85610 PROTHROMBIN TIME: CPT

## 2018-08-17 PROCEDURE — 93306 TTE W/DOPPLER COMPLETE: CPT

## 2018-08-17 PROCEDURE — 84443 ASSAY THYROID STIM HORMONE: CPT

## 2018-08-17 PROCEDURE — 99291 CRITICAL CARE FIRST HOUR: CPT | Mod: 25

## 2018-08-17 PROCEDURE — 82009 KETONE BODYS QUAL: CPT

## 2018-08-17 PROCEDURE — 85730 THROMBOPLASTIN TIME PARTIAL: CPT

## 2018-08-17 PROCEDURE — A9502: CPT

## 2018-08-17 PROCEDURE — 82088 ASSAY OF ALDOSTERONE: CPT

## 2018-08-17 PROCEDURE — 99232 SBSQ HOSP IP/OBS MODERATE 35: CPT

## 2018-08-17 PROCEDURE — 78452 HT MUSCLE IMAGE SPECT MULT: CPT

## 2018-08-17 PROCEDURE — 83735 ASSAY OF MAGNESIUM: CPT

## 2018-08-17 PROCEDURE — 83690 ASSAY OF LIPASE: CPT

## 2018-08-17 PROCEDURE — 80048 BASIC METABOLIC PNL TOTAL CA: CPT

## 2018-08-17 PROCEDURE — 84100 ASSAY OF PHOSPHORUS: CPT

## 2018-08-17 PROCEDURE — 84484 ASSAY OF TROPONIN QUANT: CPT

## 2018-08-17 PROCEDURE — 71045 X-RAY EXAM CHEST 1 VIEW: CPT

## 2018-08-17 RX ORDER — ATORVASTATIN CALCIUM 80 MG/1
1 TABLET, FILM COATED ORAL
Qty: 30 | Refills: 0 | OUTPATIENT
Start: 2018-08-17 | End: 2018-09-15

## 2018-08-17 RX ORDER — POLYETHYLENE GLYCOL 3350 17 G/17G
17 POWDER, FOR SOLUTION ORAL
Qty: 0 | Refills: 0 | COMMUNITY
Start: 2018-08-17

## 2018-08-17 RX ORDER — ATORVASTATIN CALCIUM 80 MG/1
1 TABLET, FILM COATED ORAL
Qty: 0 | Refills: 0 | COMMUNITY
Start: 2018-08-17

## 2018-08-17 RX ORDER — SENNA PLUS 8.6 MG/1
2 TABLET ORAL
Qty: 20 | Refills: 0 | OUTPATIENT
Start: 2018-08-17 | End: 2018-08-26

## 2018-08-17 RX ORDER — POLYETHYLENE GLYCOL 3350 17 G/17G
17 POWDER, FOR SOLUTION ORAL
Qty: 10 | Refills: 0 | OUTPATIENT
Start: 2018-08-17 | End: 2018-08-26

## 2018-08-17 RX ORDER — LISINOPRIL 2.5 MG/1
1 TABLET ORAL
Qty: 30 | Refills: 0 | OUTPATIENT
Start: 2018-08-17 | End: 2018-09-15

## 2018-08-17 RX ORDER — SENNOSIDES/DOCUSATE SODIUM 8.6MG-50MG
1 TABLET ORAL
Qty: 30 | Refills: 0 | OUTPATIENT
Start: 2018-08-17 | End: 2018-09-15

## 2018-08-17 RX ORDER — METOPROLOL TARTRATE 50 MG
1 TABLET ORAL
Qty: 60 | Refills: 0 | OUTPATIENT
Start: 2018-08-17 | End: 2018-09-15

## 2018-08-17 RX ORDER — ASPIRIN/CALCIUM CARB/MAGNESIUM 324 MG
1 TABLET ORAL
Qty: 30 | Refills: 0 | OUTPATIENT
Start: 2018-08-17 | End: 2018-09-15

## 2018-08-17 RX ORDER — LISINOPRIL 2.5 MG/1
1 TABLET ORAL
Qty: 20 | Refills: 0 | OUTPATIENT
Start: 2018-08-17 | End: 2018-09-05

## 2018-08-17 RX ORDER — METOPROLOL TARTRATE 50 MG
1 TABLET ORAL
Qty: 0 | Refills: 0 | COMMUNITY

## 2018-08-17 RX ORDER — SENNA PLUS 8.6 MG/1
2 TABLET ORAL
Qty: 0 | Refills: 0 | COMMUNITY
Start: 2018-08-17

## 2018-08-17 RX ADMIN — LISINOPRIL 10 MILLIGRAM(S): 2.5 TABLET ORAL at 05:49

## 2018-08-17 RX ADMIN — Medication 75 MILLIGRAM(S): at 05:48

## 2018-08-17 RX ADMIN — Medication 81 MILLIGRAM(S): at 12:28

## 2018-08-17 NOTE — PROGRESS NOTE ADULT - ASSESSMENT
57 yo M smoker with HTN who presented with chest pain in setting of hypertensive urgency    1. HTN: Currently on lisinopril 10mg, metoprolol 75 mg Q12H,   -BP has been better controlled over past 24 hours  -Echo shows LVH, preserved EF  -Secondary HTN work-up including renin, aldosterone, cortisol levels all WNL; would check renal artery duplex as outpatient [not performed in hospital]  -Patient can follow up with me in the office in 7-10 days; will consider adding on low-dose amlodipine on outpatient basis    2. Chest pain: Likely due to underlying HTN, particularly in setting of negative cardiac enzymes x3; however patient will need stress test given his cardiac risk factors  **Stress negative for ischemia    3. HLD: Patient's 10-year risk of atherosclerotic cardiovascular disease (ASCVD) is 12.8% based on the pooled cohort equations.   -Continue high intensity statin, atorvastatin 40mg PO QH    ***Note that this is a preliminary note and any recommendations should NOT be carried out until this note is finalized. *** 59 yo M smoker with HTN who presented with chest pain in setting of hypertensive urgency    1. HTN: Currently on lisinopril 10mg, metoprolol 75 mg Q12H,   -BP has been better controlled over past 24 hours  -Echo shows LVH, preserved EF  -Secondary HTN work-up including renin, aldosterone, cortisol levels all WNL; would check renal artery duplex as outpatient [not performed in hospital]  -Patient can follow up with me in the office in 7-10 days; will consider adding on low-dose amlodipine on outpatient basis    2. Chest pain: Likely due to underlying HTN, particularly in setting of negative cardiac enzymes x3; however patient will need stress test given his cardiac risk factors  **Stress negative for ischemia    3. HLD: Patient's 10-year risk of atherosclerotic cardiovascular disease (ASCVD) is 12.8% based on the pooled cohort equations.   -Continue high intensity statin, atorvastatin 40mg PO QH

## 2018-08-17 NOTE — PROGRESS NOTE ADULT - SUBJECTIVE AND OBJECTIVE BOX
PRESENTING CC: Chest pain    SUBJ:     In summary, this is a 57 yo M former smoker with HTN who presented with chest pain in the setting of hypertensive urgency (202/111 initial BP).    Overnight, there were no acute events. Patient      ----------------------  Description of patient's presenting symptoms from my prior note: Patient reports he was driving to the hospital on 8/14 to visit a family member when he felt chest pain en route. This was left sided, pressure-like in nature, radiating to the left arm and associated with diuresis. No dyspnea or lightheadedness. He also felt his left arm numbness.     PMH: As above, also S/P meniscectomy, S/P appendectomy  Allergies    No Known Allergies    MEDICATIONS  (STANDING):  aspirin  chewable 81 milliGRAM(s) Oral daily  atorvastatin 40 milliGRAM(s) Oral at bedtime  enoxaparin Injectable 40 milliGRAM(s) SubCutaneous daily  lisinopril 10 milliGRAM(s) Oral daily  metoprolol tartrate 75 milliGRAM(s) Oral every 12 hours  polyethylene glycol 3350 17 Gram(s) Oral daily  senna 2 Tablet(s) Oral at bedtime    MEDICATIONS  (PRN):  acetaminophen   Tablet. 650 milliGRAM(s) Oral every 6 hours PRN Mild Pain (1 - 3)  nitroglycerin     SubLingual 0.4 milliGRAM(s) SubLingual every 5 minutes PRN Chest Pain      FAMILY HISTORY:  Family history of essential hypertension (Mother)    Reviewed; no change from my prior note    SOCIAL HISTORY:  Reviewed, no change from my prior note    REVIEW OF SYSTEMS:  Constitutional: [ ] fever, [ ]weight loss,  [ ]fatigue  Eyes: [ ] visual changes  Respiratory: [ ]shortness of breath;  [ ] cough, [ ]wheezing, [ ]chills, [ ]hemoptysis  Cardiovascular: [ ] chest pain, [ ]palpitations, [ ]dizziness,  [ ]leg swelling [ ]syncope  Gastrointestinal: [ ] abdominal pain, [ ]nausea, [ ]vomiting,  [ ]diarrhea   Genitourinary: [ ] dysuria, [ ] hematuria  Neurologic: [ ] headaches [ ] tremors [ ] weakness [ ] lightheadedness  Skin: [ ] itching, [ ]burning, [ ] rashes  Endocrine: [ ] heat or cold intolerance  Musculoskeletal: [ ] joint pain or swelling; [ ] muscle, back, or extremity pain  Psychiatric: [ ] depression, [ ]anxiety, [ ]mood swings, or [ ]difficulty sleeping  Hematologic: [ ] easy bruising, [ ] bleeding gums    [x] All remaining systems negative except as per above.   [  ] Unable to obtain    Vital Signs Last 24 Hrs  T(C): 36.3 (17 Aug 2018 05:31), Max: 36.7 (16 Aug 2018 21:25)  T(F): 97.4 (17 Aug 2018 05:31), Max: 98.1 (16 Aug 2018 21:25)  HR: 58 (17 Aug 2018 05:31) (58 - 108)  BP: 154/91 (17 Aug 2018 05:31) (135/79 - 154/91)  BP(mean): 93 (16 Aug 2018 21:25) (93 - 93)  RR: 18 (17 Aug 2018 05:31) (17 - 18)  SpO2: 97% (17 Aug 2018 05:31) (94% - 97%)  I&O's Summary      PHYSICAL EXAM:  General: No acute distress  HEENT: EOMI, PERRL  Neck: Supple, No JVD  Lungs: Clear to auscultation bilaterally; No rales or wheezing  Heart: Regular rate and rhythm; No murmurs, rubs, or gallops  Abdomen: Nontender, bowel sounds present  Extremities: No clubbing, cyanosis, or edema  Nervous system:  Alert & Oriented X3, no focal deficits  Psychiatric: Normal affect  Skin: No rashes or lesions    LABS:  08-17    139  |  104  |  20<H>  ----------------------------<  98  4.8   |  28  |  1.27    Ca    8.7      17 Aug 2018 06:26  Phos  3.1     08-17  Mg     2.4     08-17      Creatinine Trend: 1.27<--, 1.28<--, 1.23<--, 1.29<--                        14.4   6.2   )-----------( 217      ( 17 Aug 2018 06:26 )             44.4         TELEMETRY:    STRESS TEST:    < from: Nuclear Stress Test-Pharmacologic (08.16.18 @ 11:39) >  IMPRESSIONS:  * There are small, fixed, mild to moderate intensity  defects in the mid inferior and inferoapical walls that  thicken normally, consistent with attenuation artifact.  * Post-stressresting myocardial perfusion gated SPECT  imaging was performed (LVEF = 61 %;LVEDV = 140 ml.)  * Negative study for reversible ischemia    < end of copied text > PRESENTING CC: Chest pain    SUBJ:     In summary, this is a 57 yo M former smoker with HTN who presented with chest pain in the setting of hypertensive urgency (202/111 initial BP).    Overnight, there were no acute events. Patient reports eh feels better, denies chest pain or dyspnea.     ----------------------  Description of patient's presenting symptoms from my prior note: Patient reports he was driving to the hospital on 8/14 to visit a family member when he felt chest pain en route. This was left sided, pressure-like in nature, radiating to the left arm and associated with diuresis. No dyspnea or lightheadedness. He also felt his left arm numbness.     PMH: As above, also S/P meniscectomy, S/P appendectomy  Allergies    No Known Allergies    MEDICATIONS  (STANDING):  aspirin  chewable 81 milliGRAM(s) Oral daily  atorvastatin 40 milliGRAM(s) Oral at bedtime  enoxaparin Injectable 40 milliGRAM(s) SubCutaneous daily  lisinopril 10 milliGRAM(s) Oral daily  metoprolol tartrate 75 milliGRAM(s) Oral every 12 hours  polyethylene glycol 3350 17 Gram(s) Oral daily  senna 2 Tablet(s) Oral at bedtime    MEDICATIONS  (PRN):  acetaminophen   Tablet. 650 milliGRAM(s) Oral every 6 hours PRN Mild Pain (1 - 3)  nitroglycerin     SubLingual 0.4 milliGRAM(s) SubLingual every 5 minutes PRN Chest Pain      FAMILY HISTORY:  Family history of essential hypertension (Mother)    Reviewed; no change from my prior note    SOCIAL HISTORY:  Reviewed, no change from my prior note    REVIEW OF SYSTEMS:  Constitutional: [ ] fever, [ ]weight loss,  [ ]fatigue  Eyes: [ ] visual changes  Respiratory: [ ]shortness of breath;  [ ] cough, [ ]wheezing, [ ]chills, [ ]hemoptysis  Cardiovascular: [ ] chest pain, [ ]palpitations, [ ]dizziness,  [ ]leg swelling [ ]syncope  Gastrointestinal: [ ] abdominal pain, [ ]nausea, [ ]vomiting,  [ ]diarrhea   Genitourinary: [ ] dysuria, [ ] hematuria  Neurologic: [ ] headaches [ ] tremors [ ] weakness [ ] lightheadedness  Skin: [ ] itching, [ ]burning, [ ] rashes  Endocrine: [ ] heat or cold intolerance  Musculoskeletal: [ ] joint pain or swelling; [ ] muscle, back, or extremity pain  Psychiatric: [ ] depression, [ ]anxiety, [ ]mood swings, or [ ]difficulty sleeping  Hematologic: [ ] easy bruising, [ ] bleeding gums    [x] All remaining systems negative except as per above.   [  ] Unable to obtain    Vital Signs Last 24 Hrs  T(C): 36.3 (17 Aug 2018 05:31), Max: 36.7 (16 Aug 2018 21:25)  T(F): 97.4 (17 Aug 2018 05:31), Max: 98.1 (16 Aug 2018 21:25)  HR: 58 (17 Aug 2018 05:31) (58 - 108)  BP: 154/91 (17 Aug 2018 05:31) (135/79 - 154/91)  BP(mean): 93 (16 Aug 2018 21:25) (93 - 93)  RR: 18 (17 Aug 2018 05:31) (17 - 18)  SpO2: 97% (17 Aug 2018 05:31) (94% - 97%)  I&O's Summary      PHYSICAL EXAM:  General: No acute distress  HEENT: EOMI, PERRL  Neck: Supple, No JVD  Lungs: Clear to auscultation bilaterally; No rales or wheezing  Heart: Regular rate and rhythm; No murmurs, rubs, or gallops  Abdomen: Nontender, bowel sounds present  Extremities: No clubbing, cyanosis, or edema  Nervous system:  Alert & Oriented X3, no focal deficits  Psychiatric: Normal affect  Skin: No rashes or lesions    LABS:  08-17    139  |  104  |  20<H>  ----------------------------<  98  4.8   |  28  |  1.27    Ca    8.7      17 Aug 2018 06:26  Phos  3.1     08-17  Mg     2.4     08-17      Creatinine Trend: 1.27<--, 1.28<--, 1.23<--, 1.29<--                        14.4   6.2   )-----------( 217      ( 17 Aug 2018 06:26 )             44.4     STRESS TEST:    < from: Nuclear Stress Test-Pharmacologic (08.16.18 @ 11:39) >  IMPRESSIONS:  * There are small, fixed, mild to moderate intensity  defects in the mid inferior and inferoapical walls that  thicken normally, consistent with attenuation artifact.  * Post-stressresting myocardial perfusion gated SPECT  imaging was performed (LVEF = 61 %;LVEDV = 140 ml.)  * Negative study for reversible ischemia    < end of copied text >

## 2018-08-17 NOTE — PROGRESS NOTE ADULT - ATTENDING COMMENTS
Thank you for the courtesy of a consultation, please contact me for any additional questions
Thank you for the courtesy of a consultation, please contact me for any additional questions
Patient seen and examined with resident, Addendum to above.    57 y/o male with history of Hypertension. Admitted with uncontrolled blood pressure. Was managed with IV nitroglycerine and oral medications. This morning BP is better controlled, though target BP should be higher given long standing hypertension. Denies any chest pain or palpitations. But has a headache this morning. Able to tolerate oral diet. No focal neurologic deficits noted.     Assessment:  1. Hypertensive urgency  2. Chest pain  3. Diastolic heart dysfunction   4. Class two obesity    - Cont. BP control with lisinopril for now gradually lower BP with oral medications   - D/c imdur for now   - Echo findings noted, with diastolic heart dysfunction   - Cardiology recs appreciated, will need further cardiac work up  - Also noted brief episodes of hypoxia when patient sleeps, likely has underlying sleep apnea. In the setting of uncontrolled hypertension and Class two obesity  - Encourage oral diet  - OOB to chair   - DVT prophylaxis  - Can be transferred out of ICU today
Patient seen and examined at bedside, feels ok  extensive counselling given, on importance of compliance with diet regimen  Physical exam: vitals stable  HEENT  heart  Abdomen  Chest  LE  A/P  1- Hypertensive urgency: continue on lisinopril 10 mg daily with metoprolol 75 bid  low salt diet,  f.u outpatient  2- prediabetes: counselling given to the patient for weight loss and diet control  3- Chest pain: stress testing negative.     dc planning

## 2018-08-18 LAB — RENIN PLAS-CCNC: 0.47 NG/ML/HR — SIGNIFICANT CHANGE UP (ref 0.17–5.38)

## 2018-08-20 RX ORDER — METOPROLOL TARTRATE 50 MG
2 TABLET ORAL
Qty: 120 | Refills: 0 | OUTPATIENT
Start: 2018-08-20 | End: 2018-09-18

## 2018-10-11 PROBLEM — I10 ESSENTIAL (PRIMARY) HYPERTENSION: Chronic | Status: ACTIVE | Noted: 2018-08-14

## 2018-10-22 PROBLEM — Z00.00 ENCOUNTER FOR PREVENTIVE HEALTH EXAMINATION: Status: ACTIVE | Noted: 2018-10-22

## 2018-10-23 ENCOUNTER — APPOINTMENT (OUTPATIENT)
Dept: CARDIOLOGY | Facility: CLINIC | Age: 58
End: 2018-10-23

## 2021-01-10 NOTE — PROGRESS NOTE ADULT - ASSESSMENT
57 yo M smoker with HTN who presented with chest pain in setting of hypertensive urgency    1. HTN: Currently on lisinopril 5mg, metoprolol 100mg Q12H,   -BP has been fluctuating between 170s-140s systolic  -Would uptitrate lisinopril to 10mg daily [give 1x 5mg dose], since patient's A1c is 6  -Echo shows LVH, preserved EF  -Secondary HTN work-up including renin, aldosterone, cortisol levels all WNL; would check renal artery duplex as outpatient [not performed in hospital]  -Given very high BP and hand numbness yesterday, would perform non-contrast CT head to rule out ICH    2. Chest pain: Likely due to underlying HTN, particularly in setting of negative cardiac enzymes x3; however patient will likely need stress test given his cardiac risk factors  -Likely stress test today; would perform CT head prior as per above    3. HLD: Patient's 10-year risk of atherosclerotic cardiovascular disease (ASCVD) is 12.8% based on the pooled cohort equations.   -Continue high intensity statin, atorvastatin 40mg PO QHS    ***Note that this is a preliminary note and any recommendations should NOT be carried out until this note is finalized. *** 57 yo M smoker with HTN who presented with chest pain in setting of hypertensive urgency    1. HTN: Currently on lisinopril 5mg, metoprolol 100mg Q12H,   -BP has been fluctuating between 170s-140s systolic  -Would uptitrate lisinopril to 10mg daily [give 1x 5mg dose], since patient's A1c is 6  -Echo shows LVH, preserved EF  -Secondary HTN work-up including renin, aldosterone, cortisol levels all WNL; would check renal artery duplex as outpatient [not performed in hospital]  -Given very high BP and hand numbness yesterday, would perform non-contrast CT head to rule out ICH  **CT head negative for ICH  -If patient is discharged tomorrow, he can follow up with me in the office in 7-10 days.     2. Chest pain: Likely due to underlying HTN, particularly in setting of negative cardiac enzymes x3; however patient will need stress test given his cardiac risk factors  **Stress negative for ischemia    3. HLD: Patient's 10-year risk of atherosclerotic cardiovascular disease (ASCVD) is 12.8% based on the pooled cohort equations.   -Continue high intensity statin, atorvastatin 40mg PO QHS no nausea/no vomiting/no diarrhea/no constipation

## 2023-01-30 NOTE — DISCHARGE NOTE ADULT - MEDICATION SUMMARY - MEDICATIONS TO TAKE
To get better and follow your care plan as instructed. I will START or STAY ON the medications listed below when I get home from the hospital:    aspirin 81 mg oral tablet, chewable  -- 1 tab(s) by mouth once a day  -- Indication: For Prophylactic measure    lisinopril 10 mg oral tablet  -- 1 tab(s) by mouth once a day  -- Indication: For HTN (hypertension)    atorvastatin 40 mg oral tablet  -- 1 tab(s) by mouth once a day (at bedtime)  -- Indication: For HLD    metoprolol tartrate 75 mg oral tablet  -- 1 tab(s) by mouth 2 times a day   -- It is very important that you take or use this exactly as directed.  Do not skip doses or discontinue unless directed by your doctor.  May cause drowsiness.  Alcohol may intensify this effect.  Use care when operating dangerous machinery.  Some non-prescription drugs may aggravate your condition.  Read all labels carefully.  If a warning appears, check with your doctor before taking.  Take with food or milk.  This drug may impair the ability to drive or operate machinery.  Use care until you become familiar with its effects.    -- Indication: For Essential hypertension    polyethylene glycol 3350 oral powder for reconstitution  -- 17 gram(s) by mouth once a day  -- Indication: For Constipation    senna oral tablet  -- 2 tab(s) by mouth once a day (at bedtime)  -- Indication: For Constipation I will START or STAY ON the medications listed below when I get home from the hospital:    aspirin 81 mg oral tablet, chewable  -- 1 tab(s) by mouth once a day  -- Indication: For Prophylactic measure    lisinopril 10 mg oral tablet  -- 1 tab(s) by mouth once a day   -- Do not take this drug if you are pregnant.  It is very important that you take or use this exactly as directed.  Do not skip doses or discontinue unless directed by your doctor.  Some non-prescription drugs may aggravate your condition.  Read all labels carefully.  If a warning appears, check with your doctor before taking.    -- Indication: For HTN (hypertension)    atorvastatin 40 mg oral tablet  -- 1 tab(s) by mouth once a day (at bedtime)  -- Indication: For Hld     metoprolol tartrate 75 mg oral tablet  -- 1 tab(s) by mouth 2 times a day   -- It is very important that you take or use this exactly as directed.  Do not skip doses or discontinue unless directed by your doctor.  May cause drowsiness.  Alcohol may intensify this effect.  Use care when operating dangerous machinery.  Some non-prescription drugs may aggravate your condition.  Read all labels carefully.  If a warning appears, check with your doctor before taking.  Take with food or milk.  This drug may impair the ability to drive or operate machinery.  Use care until you become familiar with its effects.    -- Indication: For HTN (hypertension)    polyethylene glycol 3350 oral powder for reconstitution  -- 17 gram(s) by mouth once a day  -- Indication: For diarrhea     docusate-senna 50 mg-8.6 mg oral tablet  -- 1 tab(s) by mouth once a day   -- Medication should be taken with plenty of water.    -- Indication: For constipation     senna oral tablet  -- 2 tab(s) by mouth once a day (at bedtime)  -- Indication: For constipation

## 2023-03-12 NOTE — H&P ADULT - DOES THIS PATIENT HAVE A HISTORY OF OR HAS BEEN DX WITH HEART FAILURE?
[id number], BEART, [id number], PHART, THGBART, Eladia Mustafa [place]        Jennifer Thorne MD    Thank you Eva Mcgrath MD for the opportunity to be involved in this patient's care. If you have any questions or concerns please feel free to contact me at [phone]. [phone]